# Patient Record
Sex: MALE | Race: WHITE | Employment: FULL TIME | URBAN - METROPOLITAN AREA
[De-identification: names, ages, dates, MRNs, and addresses within clinical notes are randomized per-mention and may not be internally consistent; named-entity substitution may affect disease eponyms.]

---

## 2017-01-06 NOTE — PERIOP NOTES
Dr. Rosa Marsh called to see if an H&P from 11/2016 is sufficient for surgery on 11/11/2017. Informed his that an H&P would have to be performed within 30 days of surgery. Dr. Rosa Marsh to fax over orders and H&P before patient's scheduled PAT appointment on 1/11/2016.   He is aware that our NP's do not do H&P's on Podiatry patients, per Patrizia.  DOS: 1/12/2016

## 2017-01-11 ENCOUNTER — ANESTHESIA EVENT (OUTPATIENT)
Dept: SURGERY | Age: 57
End: 2017-01-11
Payer: COMMERCIAL

## 2017-01-11 ENCOUNTER — HOSPITAL ENCOUNTER (OUTPATIENT)
Dept: PREADMISSION TESTING | Age: 57
Discharge: HOME | End: 2017-01-11
Payer: COMMERCIAL

## 2017-01-11 VITALS
TEMPERATURE: 97.8 F | SYSTOLIC BLOOD PRESSURE: 157 MMHG | HEART RATE: 79 BPM | DIASTOLIC BLOOD PRESSURE: 83 MMHG | WEIGHT: 161.6 LBS | RESPIRATION RATE: 17 BRPM | HEIGHT: 70 IN | OXYGEN SATURATION: 96 % | BODY MASS INDEX: 23.13 KG/M2

## 2017-01-11 LAB
ANION GAP BLD CALC-SCNC: 9 MMOL/L (ref 5–15)
APPEARANCE UR: CLEAR
APTT PPP: 29.6 SEC (ref 22.1–32.5)
ATRIAL RATE: 82 BPM
BACTERIA URNS QL MICRO: NEGATIVE /HPF
BASOPHILS # BLD AUTO: 0 K/UL (ref 0–0.1)
BASOPHILS # BLD: 1 % (ref 0–1)
BILIRUB UR QL: NEGATIVE
BUN SERPL-MCNC: 19 MG/DL (ref 6–20)
BUN/CREAT SERPL: 18 (ref 12–20)
CALCIUM SERPL-MCNC: 8.9 MG/DL (ref 8.5–10.1)
CALCULATED P AXIS, ECG09: 79 DEGREES
CALCULATED R AXIS, ECG10: 69 DEGREES
CALCULATED T AXIS, ECG11: 73 DEGREES
CHLORIDE SERPL-SCNC: 104 MMOL/L (ref 97–108)
CO2 SERPL-SCNC: 28 MMOL/L (ref 21–32)
COLOR UR: ABNORMAL
CREAT SERPL-MCNC: 1.03 MG/DL (ref 0.7–1.3)
DIAGNOSIS, 93000: NORMAL
EOSINOPHIL # BLD: 0.2 K/UL (ref 0–0.4)
EOSINOPHIL NFR BLD: 3 % (ref 0–7)
EPITH CASTS URNS QL MICRO: ABNORMAL /LPF
ERYTHROCYTE [DISTWIDTH] IN BLOOD BY AUTOMATED COUNT: 14.7 % (ref 11.5–14.5)
GLUCOSE SERPL-MCNC: 89 MG/DL (ref 65–100)
GLUCOSE UR STRIP.AUTO-MCNC: NEGATIVE MG/DL
HCT VFR BLD AUTO: 43.5 % (ref 36.6–50.3)
HGB BLD-MCNC: 13.5 G/DL (ref 12.1–17)
HGB UR QL STRIP: ABNORMAL
HYALINE CASTS URNS QL MICRO: ABNORMAL /LPF (ref 0–5)
INR PPP: 1 (ref 0.9–1.1)
KETONES UR QL STRIP.AUTO: NEGATIVE MG/DL
LEUKOCYTE ESTERASE UR QL STRIP.AUTO: NEGATIVE
LYMPHOCYTES # BLD AUTO: 16 % (ref 12–49)
LYMPHOCYTES # BLD: 1 K/UL (ref 0.8–3.5)
MCH RBC QN AUTO: 29 PG (ref 26–34)
MCHC RBC AUTO-ENTMCNC: 31 G/DL (ref 30–36.5)
MCV RBC AUTO: 93.3 FL (ref 80–99)
MONOCYTES # BLD: 0.6 K/UL (ref 0–1)
MONOCYTES NFR BLD AUTO: 10 % (ref 5–13)
NEUTS SEG # BLD: 4.3 K/UL (ref 1.8–8)
NEUTS SEG NFR BLD AUTO: 70 % (ref 32–75)
NITRITE UR QL STRIP.AUTO: NEGATIVE
P-R INTERVAL, ECG05: 178 MS
PH UR STRIP: 6.5 [PH] (ref 5–8)
PLATELET # BLD AUTO: 264 K/UL (ref 150–400)
POTASSIUM SERPL-SCNC: 4.3 MMOL/L (ref 3.5–5.1)
PROT UR STRIP-MCNC: NEGATIVE MG/DL
PROTHROMBIN TIME: 10 SEC (ref 9–11.1)
Q-T INTERVAL, ECG07: 388 MS
QRS DURATION, ECG06: 94 MS
QTC CALCULATION (BEZET), ECG08: 453 MS
RBC # BLD AUTO: 4.66 M/UL (ref 4.1–5.7)
RBC #/AREA URNS HPF: ABNORMAL /HPF (ref 0–5)
SODIUM SERPL-SCNC: 141 MMOL/L (ref 136–145)
SP GR UR REFRACTOMETRY: 1.02 (ref 1–1.03)
THERAPEUTIC RANGE,PTTT: NORMAL SECS (ref 58–77)
UA: UC IF INDICATED,UAUC: ABNORMAL
UROBILINOGEN UR QL STRIP.AUTO: 0.2 EU/DL (ref 0.2–1)
VENTRICULAR RATE, ECG03: 82 BPM
WBC # BLD AUTO: 6.1 K/UL (ref 4.1–11.1)
WBC URNS QL MICRO: ABNORMAL /HPF (ref 0–4)

## 2017-01-11 PROCEDURE — 80048 BASIC METABOLIC PNL TOTAL CA: CPT | Performed by: PODIATRIST

## 2017-01-11 PROCEDURE — 93005 ELECTROCARDIOGRAM TRACING: CPT

## 2017-01-11 PROCEDURE — 81001 URINALYSIS AUTO W/SCOPE: CPT | Performed by: PODIATRIST

## 2017-01-11 PROCEDURE — 85610 PROTHROMBIN TIME: CPT | Performed by: PODIATRIST

## 2017-01-11 PROCEDURE — 85730 THROMBOPLASTIN TIME PARTIAL: CPT | Performed by: PODIATRIST

## 2017-01-11 PROCEDURE — 85025 COMPLETE CBC W/AUTO DIFF WBC: CPT | Performed by: PODIATRIST

## 2017-01-11 PROCEDURE — 36415 COLL VENOUS BLD VENIPUNCTURE: CPT | Performed by: PODIATRIST

## 2017-01-11 RX ORDER — AZITHROMYCIN 500 MG/1
500 TABLET, FILM COATED ORAL
COMMUNITY
Start: 2017-01-09 | End: 2017-01-13

## 2017-01-11 NOTE — PERIOP NOTES
[facility]  PREOPERATIVE INSTRUCTIONS    Surgery Date:  Thursday, January 12, 2017. Surgery arrival time given by surgeon: NO   If no,[facility] staff will call you between 2 PM- 7PM the day before surgery with your arrival time. If your surgery is on a Monday, we will call you the preceding Friday. Please call [phone] after 8 PM if you did not receive your arrival time. Verification phone call on Wednesday, January 11, 2017. 1. Please [facility] at the designated [facility] [address] [facility]. Bring your insurance card, photo identification, and any copayment ( if applicable). 2. You must have a responsible adult to drive you home. You need to have a responsible adult to stay with you the first 24 hours after surgery if you are going home the same day of your surgery and you should not drive a car for 24 hours following your surgery. 3. Nothing to eat or drink after midnight the night before surgery. This includes no water, gum, mints, coffee, juice, etc.  Please note special instructions, if applicable, below for medications. 4. MEDICATIONS TO TAKE THE MORNING OF SURGERY WITH A SIP OF WATER: None. 5. No alcoholic beverages 24 hours before or after your surgery. 6. If you are being admitted to the hospital,please leave personal belongings/luggage in your car until you have an assigned hospital room number. 7. Stop Aspirin and/or any non-steroidal anti-inflammatory drugs (i.e. Ibuprofen, Naproxen, Advil, Aleve) as directed by your surgeon. You may take Tylenol. Stop herbal supplements 1 week prior to  surgery. 8. If you are currently taking Plavix, Coumadin,or any other blood-thinning/anticoagulant medication contact your surgeon for instructions. 9. Please wear comfortable clothes. Wear your glasses instead of contacts. We ask that all money, jewelry and valuables be left at home. Wear no make up, particularly mascara, the day of surgery.    10.  All body piercings, rings,and jewelry need to be removed and left at home. Please wear your hair loose or down. Please no pony-tails, buns, or any metal hair accessories. If you shower the morning of surgery, please do not apply any lotions, powders, or deodorants afterwards. Do not shave any body area within 24 hours of your surgery. 11. Please follow all instructions to avoid any potential surgical cancellation. 12.  Should your physical condition change, (i.e. fever, cold, flu, etc.) please notify your surgeon as soon as possible. 13. It is important to be on time. If a situation occurs where you may be delayed, please call:  [phone] / [phone] on the day of surgery. 14. The [facility] staff can be reached at [phone]. .  15. Special instructions: FRee  parking. Bring completed medication form on day of surgery . The patient was contacted  in person. He  verbalize  understanding of all instructions does not  need reinforcement.

## 2017-01-12 ENCOUNTER — ANESTHESIA (OUTPATIENT)
Dept: SURGERY | Age: 57
End: 2017-01-12
Payer: COMMERCIAL

## 2017-01-12 ENCOUNTER — APPOINTMENT (OUTPATIENT)
Dept: GENERAL RADIOLOGY | Age: 57
End: 2017-01-12
Attending: PODIATRIST
Payer: COMMERCIAL

## 2017-01-12 ENCOUNTER — HOSPITAL ENCOUNTER (OUTPATIENT)
Age: 57
Setting detail: OUTPATIENT SURGERY
Discharge: HOME | End: 2017-01-12
Attending: PODIATRIST | Admitting: PODIATRIST
Payer: COMMERCIAL

## 2017-01-12 VITALS
HEIGHT: 70 IN | SYSTOLIC BLOOD PRESSURE: 131 MMHG | BODY MASS INDEX: 23.13 KG/M2 | HEART RATE: 79 BPM | TEMPERATURE: 98 F | OXYGEN SATURATION: 96 % | DIASTOLIC BLOOD PRESSURE: 63 MMHG | RESPIRATION RATE: 24 BRPM | WEIGHT: 161.6 LBS

## 2017-01-12 PROCEDURE — 74011250636 HC RX REV CODE- 250/636: Performed by: PODIATRIST

## 2017-01-12 PROCEDURE — 76210000050 HC AMBSU PH II REC 0.5 TO 1 HR: Performed by: PODIATRIST

## 2017-01-12 PROCEDURE — 76000 FLUOROSCOPY <1 HR PHYS/QHP: CPT

## 2017-01-12 PROCEDURE — 77030018836 HC SOL IRR NACL ICUM -A: Performed by: PODIATRIST

## 2017-01-12 PROCEDURE — 77030020782 HC GWN BAIR PAWS FLX 3M -B

## 2017-01-12 PROCEDURE — 74011000250 HC RX REV CODE- 250

## 2017-01-12 PROCEDURE — 76030000000 HC AMB SURG OR TIME 0.5 TO 1: Performed by: PODIATRIST

## 2017-01-12 PROCEDURE — 77030031139 HC SUT VCRL2 J&J -A: Performed by: PODIATRIST

## 2017-01-12 PROCEDURE — 74011000250 HC RX REV CODE- 250: Performed by: PODIATRIST

## 2017-01-12 PROCEDURE — 77030013140 HC MSK NEB VYRM -A

## 2017-01-12 PROCEDURE — 77030020754 HC CUF TRNQT 2BLA STRY -B: Performed by: PODIATRIST

## 2017-01-12 PROCEDURE — 76060000061 HC AMB SURG ANES 0.5 TO 1 HR: Performed by: PODIATRIST

## 2017-01-12 PROCEDURE — 74011250636 HC RX REV CODE- 250/636: Performed by: ANESTHESIOLOGY

## 2017-01-12 PROCEDURE — 74011250636 HC RX REV CODE- 250/636

## 2017-01-12 PROCEDURE — 77030002916 HC SUT ETHLN J&J -A: Performed by: PODIATRIST

## 2017-01-12 PROCEDURE — 74011000250 HC RX REV CODE- 250: Performed by: ANESTHESIOLOGY

## 2017-01-12 PROCEDURE — 88300 SURGICAL PATH GROSS: CPT | Performed by: PODIATRIST

## 2017-01-12 RX ORDER — SODIUM CHLORIDE 0.9 % (FLUSH) 0.9 %
5-10 SYRINGE (ML) INJECTION AS NEEDED
Status: DISCONTINUED | OUTPATIENT
Start: 2017-01-12 | End: 2017-01-12 | Stop reason: HOSPADM

## 2017-01-12 RX ORDER — SODIUM CHLORIDE, SODIUM LACTATE, POTASSIUM CHLORIDE, CALCIUM CHLORIDE 600; 310; 30; 20 MG/100ML; MG/100ML; MG/100ML; MG/100ML
125 INJECTION, SOLUTION INTRAVENOUS CONTINUOUS
Status: DISCONTINUED | OUTPATIENT
Start: 2017-01-12 | End: 2017-01-12 | Stop reason: HOSPADM

## 2017-01-12 RX ORDER — LIDOCAINE HYDROCHLORIDE 10 MG/ML
0.1 INJECTION, SOLUTION EPIDURAL; INFILTRATION; INTRACAUDAL; PERINEURAL AS NEEDED
Status: DISCONTINUED | OUTPATIENT
Start: 2017-01-12 | End: 2017-01-12 | Stop reason: HOSPADM

## 2017-01-12 RX ORDER — HYDROMORPHONE HYDROCHLORIDE 1 MG/ML
.25-1 INJECTION, SOLUTION INTRAMUSCULAR; INTRAVENOUS; SUBCUTANEOUS
Status: DISCONTINUED | OUTPATIENT
Start: 2017-01-12 | End: 2017-01-12 | Stop reason: HOSPADM

## 2017-01-12 RX ORDER — PROPOFOL 10 MG/ML
INJECTION, EMULSION INTRAVENOUS AS NEEDED
Status: DISCONTINUED | OUTPATIENT
Start: 2017-01-12 | End: 2017-01-12 | Stop reason: HOSPADM

## 2017-01-12 RX ORDER — LIDOCAINE HYDROCHLORIDE 20 MG/ML
INJECTION, SOLUTION EPIDURAL; INFILTRATION; INTRACAUDAL; PERINEURAL AS NEEDED
Status: DISCONTINUED | OUTPATIENT
Start: 2017-01-12 | End: 2017-01-12 | Stop reason: HOSPADM

## 2017-01-12 RX ORDER — PROPOFOL 10 MG/ML
INJECTION, EMULSION INTRAVENOUS
Status: DISCONTINUED | OUTPATIENT
Start: 2017-01-12 | End: 2017-01-12 | Stop reason: HOSPADM

## 2017-01-12 RX ORDER — SODIUM CHLORIDE 0.9 % (FLUSH) 0.9 %
5-10 SYRINGE (ML) INJECTION EVERY 8 HOURS
Status: DISCONTINUED | OUTPATIENT
Start: 2017-01-12 | End: 2017-01-12 | Stop reason: HOSPADM

## 2017-01-12 RX ORDER — ONDANSETRON 2 MG/ML
4 INJECTION INTRAMUSCULAR; INTRAVENOUS AS NEEDED
Status: DISCONTINUED | OUTPATIENT
Start: 2017-01-12 | End: 2017-01-12 | Stop reason: HOSPADM

## 2017-01-12 RX ORDER — CEFAZOLIN SODIUM IN 0.9 % NACL 2 G/50 ML
2 INTRAVENOUS SOLUTION, PIGGYBACK (ML) INTRAVENOUS ONCE
Status: COMPLETED | OUTPATIENT
Start: 2017-01-12 | End: 2017-01-12

## 2017-01-12 RX ORDER — FENTANYL CITRATE 50 UG/ML
INJECTION, SOLUTION INTRAMUSCULAR; INTRAVENOUS AS NEEDED
Status: DISCONTINUED | OUTPATIENT
Start: 2017-01-12 | End: 2017-01-12 | Stop reason: HOSPADM

## 2017-01-12 RX ORDER — SODIUM CHLORIDE, SODIUM LACTATE, POTASSIUM CHLORIDE, CALCIUM CHLORIDE 600; 310; 30; 20 MG/100ML; MG/100ML; MG/100ML; MG/100ML
100 INJECTION, SOLUTION INTRAVENOUS CONTINUOUS
Status: DISCONTINUED | OUTPATIENT
Start: 2017-01-12 | End: 2017-01-12 | Stop reason: HOSPADM

## 2017-01-12 RX ORDER — ALBUTEROL SULFATE 0.83 MG/ML
2.5 SOLUTION RESPIRATORY (INHALATION)
Status: COMPLETED | OUTPATIENT
Start: 2017-01-12 | End: 2017-01-12

## 2017-01-12 RX ORDER — HYDROMORPHONE HYDROCHLORIDE 1 MG/ML
.5-1 INJECTION, SOLUTION INTRAMUSCULAR; INTRAVENOUS; SUBCUTANEOUS
Status: DISCONTINUED | OUTPATIENT
Start: 2017-01-12 | End: 2017-01-12 | Stop reason: HOSPADM

## 2017-01-12 RX ORDER — LIDOCAINE HYDROCHLORIDE 20 MG/ML
INJECTION, SOLUTION INFILTRATION; PERINEURAL AS NEEDED
Status: DISCONTINUED | OUTPATIENT
Start: 2017-01-12 | End: 2017-01-12 | Stop reason: HOSPADM

## 2017-01-12 RX ORDER — MIDAZOLAM HYDROCHLORIDE 1 MG/ML
INJECTION, SOLUTION INTRAMUSCULAR; INTRAVENOUS AS NEEDED
Status: DISCONTINUED | OUTPATIENT
Start: 2017-01-12 | End: 2017-01-12 | Stop reason: HOSPADM

## 2017-01-12 RX ORDER — BUPIVACAINE HYDROCHLORIDE 5 MG/ML
INJECTION, SOLUTION EPIDURAL; INTRACAUDAL AS NEEDED
Status: DISCONTINUED | OUTPATIENT
Start: 2017-01-12 | End: 2017-01-12 | Stop reason: HOSPADM

## 2017-01-12 RX ORDER — DIPHENHYDRAMINE HYDROCHLORIDE 50 MG/ML
12.5 INJECTION, SOLUTION INTRAMUSCULAR; INTRAVENOUS AS NEEDED
Status: DISCONTINUED | OUTPATIENT
Start: 2017-01-12 | End: 2017-01-12 | Stop reason: HOSPADM

## 2017-01-12 RX ADMIN — PROPOFOL 40 MG: 10 INJECTION, EMULSION INTRAVENOUS at 11:06

## 2017-01-12 RX ADMIN — LIDOCAINE HYDROCHLORIDE 60 MG: 20 INJECTION, SOLUTION INFILTRATION; PERINEURAL at 11:00

## 2017-01-12 RX ADMIN — CEFAZOLIN 2 G: 1 INJECTION, POWDER, FOR SOLUTION INTRAMUSCULAR; INTRAVENOUS; PARENTERAL at 11:00

## 2017-01-12 RX ADMIN — SODIUM CHLORIDE, POTASSIUM CHLORIDE, SODIUM LACTATE AND CALCIUM CHLORIDE: 600; 310; 30; 20 INJECTION, SOLUTION INTRAVENOUS at 10:53

## 2017-01-12 RX ADMIN — PROPOFOL 50 MG: 10 INJECTION, EMULSION INTRAVENOUS at 11:05

## 2017-01-12 RX ADMIN — FENTANYL CITRATE 25 MCG: 50 INJECTION, SOLUTION INTRAMUSCULAR; INTRAVENOUS at 11:10

## 2017-01-12 RX ADMIN — PROPOFOL 20 MG: 10 INJECTION, EMULSION INTRAVENOUS at 11:04

## 2017-01-12 RX ADMIN — PROPOFOL 50 MCG/KG/MIN: 10 INJECTION, EMULSION INTRAVENOUS at 11:01

## 2017-01-12 RX ADMIN — MIDAZOLAM HYDROCHLORIDE 2 MG: 1 INJECTION, SOLUTION INTRAMUSCULAR; INTRAVENOUS at 10:53

## 2017-01-12 RX ADMIN — ALBUTEROL SULFATE 2.5 MG: 2.5 SOLUTION RESPIRATORY (INHALATION) at 10:25

## 2017-01-12 RX ADMIN — PROPOFOL 80 MG: 10 INJECTION, EMULSION INTRAVENOUS at 11:00

## 2017-01-12 NOTE — ANESTHESIA POSTPROCEDURE EVALUATION
Post-Anesthesia Evaluation and Assessment    Patient: Kaye Pendleton MRN: [id number]  SSN: [id number]    YOB: 1960  Age: 60 y.o. Sex: male       Cardiovascular Function/Vital Signs  Visit Vitals    /63    Pulse 79    Temp 36.7 °C (98 °F)    Resp 24    Ht 5' 10\" (1.778 m)    Wt 73.3 kg (161 lb 9.6 oz)    SpO2 96%    BMI 23.19 kg/m2       Patient is status post MAC anesthesia for Procedure(s):  [address] [facility]. Nausea/Vomiting: None    Postoperative hydration reviewed and adequate. Pain:  Pain Scale 1: Numeric (0 - 10) (01/12/17 1132)  Pain Intensity 1: 0 (01/12/17 1132)   Managed    Neurological Status:   Neuro (WDL): Within Defined Limits (moving all 4 extremities with ease) (01/12/17 1132)   At baseline    Mental Status and Level of Consciousness: Arousable    Pulmonary Status:   O2 Device: Nasal cannula (room air) (01/12/17 1132)   Adequate oxygenation and airway patent    Complications related to anesthesia: None    Post-anesthesia assessment completed.  No concerns    Signed By: Clara Hansen MD     January 12, 2017

## 2017-01-12 NOTE — OP NOTES
Xander Nya[facility]   [address] [facility], [place], operated [facility], [address]   OP NOTE       Name:  Lynette Brennan   MR#:  [id number]   :  1960   Account #:  [de-identified]    Surgery Date:  2017   Date of Adm:  2017       PREOPERATIVE DIAGNOSIS: Painful internal fixation, right hallux. POSTOPERATIVE DIAGNOSIS: Painful internal fixation, right hallux. PROCEDURES PERFORMED: Removal internal fixation, right hallux. SURGEON: Inocencia Wilder DPM    ANESTHESIA: Local infiltrative block with intravenous sedation, local   anesthesia consisting of 10 mL of a 50/50 mixture of 2% lidocaine plain   and 0.5% Marcaine plain. TIME OPERATION BEGAN: 11:14 a.m. TIME OPERATION ENDED: 11:26 a.m. ESTIMATED BLOOD LOSS: Less than 5 mL. SPECIMENS REMOVED: One cannulated 4.0 x 42 mm Synthes   screw, right hallux. INDICATIONS: On clinical examination of one of his postop visits,   week 7, after his right foot surgery, the patient was complaining about   some pain distally in his right hallux. It was noticed that the edge of the   screw that was in the right hallux had backed out to the edge of the   skin and was noticeable. X-ray was taken, confirming this and   the patient was asked if he was, at any time, not wear his surgical shoe   for ambulation, and he said yes, for about a week prior to the visit he   had been ambulating in normal shoes and not his surgical shoe, as   was required in our plan. I indicated to the patient that he was acting   against medical advice and this could affect the outcome of the surgery   on that toe. DESCRIPTION OF PROCEDURE: The patient was brought to the   operating room and placed in the supine position. After being suitably   anesthetized, the patient's right lower extremity was prepped and   draped in the usual sterile manner.     REMOVAL OF INTERNAL FIXATION, RIGHT HALLUX: Attention was   directed to the distal tip of the right hallux where it  was noticed the   head of the screw was appearing through the skin and accessible. At   this point, a Synthes  screwdriver was used to back out the 4.0 x 42   mm cannulated screw. The screw came out in one piece and was   passed off the table for pathologic examination and confirmation. At   this point, the C-arm was used to scan the toe and it was noticed there   were no metal fragments left in the right hallux. The surgical area was   closed with 4-0 nylon suture in a simple interrupted manner. Next, the   wound was dressed with Betadine-soaked Adaptic, gauze sponges   and Thierry. The patient tolerated the surgical procedure and anesthesia well   without significant changes in cardiopulmonary status. The patient was   given postoperative care instructions prior to the procedure. It was   indicated that he would continue wearing his surgical shoe until   otherwise told not to. He had brought it with him to the hospital today. The patient was also given a prescription for Norco 7.5/325 mg 1 or 2   taken by mouth every 4-6 hours as needed for foot pain. The patient   was given an appointment for followup on Thursday 01/19/2017 in my   office at 10 a.m. At the time of discharge, the patient's vital signs stable   and there were no signs of active bleeding noted in this foot.         KAIT Welch / JOCELIN   D:  01/12/2017   14:51   T:  01/12/2017   15:56   Job #:  [id number]

## 2017-01-12 NOTE — DISCHARGE INSTRUCTIONS
Jason Miranda MEDICATION AND   SIDE EFFECT GUIDE    The Madhav Johnson MEDICATION AND SIDE EFFECT GUIDE was provided to the PATIENT AND CARE PROVIDER.   Information provided includes instruction about drug purpose and common side effects for the following medications:   ·   Jame Handler

## 2017-01-12 NOTE — ANESTHESIA PREPROCEDURE EVALUATION
Anesthetic History   No history of anesthetic complications            Review of Systems / Medical History  Patient summary reviewed    Pulmonary          Smoker         Neuro/Psych   Within defined limits           Cardiovascular    Hypertension              Exercise tolerance: >4 METS     GI/Hepatic/Renal  Within defined limits              Endo/Other  Within defined limits           Other Findings              Physical Exam    Airway  Mallampati: II  TM Distance: 4 - 6 cm  Neck ROM: normal range of motion   Mouth opening: Normal     Cardiovascular    Rhythm: regular  Rate: normal         Dental         Pulmonary                Comments: Min wheezing bilaterally - rx'ed with nebulized albuterol Abdominal         Other Findings            Anesthetic Plan    ASA: 2  Anesthesia type: MAC            Anesthetic plan and risks discussed with: Patient

## 2017-07-17 ENCOUNTER — APPOINTMENT (OUTPATIENT)
Dept: GENERAL RADIOLOGY | Age: 57
End: 2017-07-17
Attending: ORTHOPAEDIC SURGERY
Payer: COMMERCIAL

## 2017-07-17 ENCOUNTER — HOSPITAL ENCOUNTER (OUTPATIENT)
Age: 57
Setting detail: OUTPATIENT SURGERY
Discharge: HOME | End: 2017-07-17
Attending: ORTHOPAEDIC SURGERY | Admitting: ORTHOPAEDIC SURGERY
Payer: COMMERCIAL

## 2017-07-17 VITALS
TEMPERATURE: 99.2 F | DIASTOLIC BLOOD PRESSURE: 96 MMHG | WEIGHT: 163.14 LBS | SYSTOLIC BLOOD PRESSURE: 180 MMHG | RESPIRATION RATE: 16 BRPM | OXYGEN SATURATION: 100 % | BODY MASS INDEX: 23.36 KG/M2 | HEIGHT: 70 IN | HEART RATE: 96 BPM

## 2017-07-17 PROCEDURE — 74011636320 HC RX REV CODE- 636/320: Performed by: ORTHOPAEDIC SURGERY

## 2017-07-17 PROCEDURE — 77030003666 HC NDL SPINAL BD -A: Performed by: ORTHOPAEDIC SURGERY

## 2017-07-17 PROCEDURE — 76030000002 HC AMB SURG OR TIME FIRST 0.: Performed by: ORTHOPAEDIC SURGERY

## 2017-07-17 PROCEDURE — 76000 FLUOROSCOPY <1 HR PHYS/QHP: CPT

## 2017-07-17 PROCEDURE — 74011000250 HC RX REV CODE- 250: Performed by: ORTHOPAEDIC SURGERY

## 2017-07-17 PROCEDURE — 74011250636 HC RX REV CODE- 250/636: Performed by: ORTHOPAEDIC SURGERY

## 2017-07-17 PROCEDURE — 76210000046 HC AMBSU PH II REC FIRST 0.5 HR: Performed by: ORTHOPAEDIC SURGERY

## 2017-07-17 RX ORDER — LIDOCAINE HYDROCHLORIDE 10 MG/ML
INJECTION INFILTRATION; PERINEURAL AS NEEDED
Status: DISCONTINUED | OUTPATIENT
Start: 2017-07-17 | End: 2017-07-17 | Stop reason: HOSPADM

## 2017-07-17 RX ORDER — BUPIVACAINE HYDROCHLORIDE 5 MG/ML
INJECTION, SOLUTION EPIDURAL; INTRACAUDAL AS NEEDED
Status: DISCONTINUED | OUTPATIENT
Start: 2017-07-17 | End: 2017-07-17 | Stop reason: HOSPADM

## 2017-07-17 RX ORDER — TRIAMCINOLONE ACETONIDE 40 MG/ML
INJECTION, SUSPENSION INTRA-ARTICULAR; INTRAMUSCULAR AS NEEDED
Status: DISCONTINUED | OUTPATIENT
Start: 2017-07-17 | End: 2017-07-17 | Stop reason: HOSPADM

## 2017-07-17 RX ORDER — ACETAMINOPHEN AND CODEINE PHOSPHATE 300; 30 MG/1; MG/1
1 TABLET ORAL
COMMUNITY
End: 2022-07-29

## 2017-07-17 NOTE — IP AVS SNAPSHOT
[address] [facility] [address] 
 
 
 [address] [phone] Patient: Luca Sanchez MRN: [id number] OEX:3/47/1388 You are allergic to the following Allergen Reactions Adhesive Tape-Silicones Rash Please use cloth tape Recent Documentation Height Weight BMI Smoking Status 1.778 m 74 kg 23.41 kg/m2 Current Every Day Smoker Emergency Contacts Name Discharge Info Relation Home Work Mobile Elizabeth Schulte DISCHARGE CAREGIVER [3] Spouse [3] [phone] About your hospitalization You were admitted on:  July 17, 2017 You last received care in the:  [facility] PACU You were discharged on:  July 17, 2017 Unit phone number:  [phone] Why you were hospitalized Your primary diagnosis was:  Not on File Providers Seen During Your Hospitalizations Provider Role Specialty Primary office phone Lianne Cramer MD Attending Provider Orthopedic Surgery [phone] Your Primary Care Physician (PCP) Primary Care Physician Office Phone Office Fax Reginold Veronika [phone] Follow-up Information Follow up With Details Comments Contact Info Magen Li MD   [address] [facility] [address] [phone] Current Discharge Medication List  
  
CONTINUE these medications which have NOT CHANGED Dose & Instructions Dispensing Information Comments Morning Noon Evening Bedtime  
 albuterol 90 mcg/actuation inhaler Commonly known as:  PROVENTIL HFA, VENTOLIN HFA, PROAIR HFA Your last dose was: Your next dose is:    
   
   
 Dose:  2 Puff Take 2 Puffs by inhalation every six (6) hours as needed for Wheezing. Refills:  0  
     
   
   
   
  
 amLODIPine 10 mg tablet Commonly known as:  Lito Gleason Your last dose was:     
   
Your next dose is:    
   
   
 Dose:  10 mg  
 Take 10 mg by mouth daily (with dinner). Takes at 1600 daily. Refills:  0  
     
   
   
   
  
 TYLENOL-CODEINE #3 300-30 mg per tablet Generic drug:  acetaminophen-codeine Your last dose was: Your next dose is:    
   
   
 Dose:  1 Tab Take 1 Tab by mouth every four (4) hours as needed for Pain. Refills:  0 Discharge Instructions GENERAL DISCHARGE INSTRUCTIONS Patient: Berhane Veliz MRN: [id number]  SSN: [id number] Please take the time to review the following instructions before you leave the hospital and use them as guidelines during your recovery from surgery. If you have any questions you may contact my office at [phone]. SPECIAL INSTRUCTIONS :  
1. *** 
2. *** Wound Care/ Dressing Changes: SOFT DRESSING : For soft dressings you may change your dressings as needed two days after surgery. It isnt necessary to apply antibiotic ointment to your incisions. If you have steri-strips over your incision they will start to peel off in 7-10 days as you get them wet. They dont need to be removed before that. When they begin to peel off, you may remove them. Sutures or staples are removed at 12-14 days from surgery during your follow-up with Dr Zane Regalado. Showering/ Bathing: SOFT DRESSINGS ONLY : If your incision is dry with drainage you may shower 2 days after your surgery. Your dressing may be removed for showering. You may get your incisions wet in the shower. Do not vigorously scrub your incisions. Apply a clean, dry dressing after you have dried your incisions. Do not take a bath or get into a swimming pool or [facility] until you follow up with Dr. Zane Regalado. Do not soak your incision under water. If there is continued drainage or you are concerned contact Dr Gladys Simmons office prior to showering. Ice and Elevation:  
 
Ice may be applied to the incision and the skin should be protected by a layer of clothing or padding. Both upper and lower extremity surgeries benefit from elevation. For foot and ankle surgery elevation with the patient flat on the back and two to three pillows elevating the extremity may be necessary. Diet: 
You may advance to your regular diet as tolerated. Increase your clear liquid intake for the next 2-3 days. Medication: 
 
 
1. You will be given prescriptions for pain medication when you are discharged from the hospital. The side effects of these medications can be substantial and the narcotic medications are not mandatory. You may substitute these medications with Tylenol and Alleve or Motrin. 2.  Please use the medications as prescribed. Pain medications may cause constipation- Colace or Milk of Magnesia may be used as needed. Other possible side effects of pain medication are dizziness, headache, nausea, vomiting, and urinary retention. Discontinue the pain medication if you develop itching, rash, shortness of breath, or difficulties swallowing. If these symptoms become severe or are not relieved by discontinuing the medication, you should seek immediate medical attention. 3. Refills of pain medication are authorized during office hours only (8 AM- 5 PM  Monday thru Friday). Many of these medication will require you or a family member to pick-up a physical prescription at the office. 4. Medications other than antiinflammatories will not be called into the pharmacy after business hours. 5. Do not take Tylenol/Acetaminophen in addition to your pain medication as most pain medications already contain this ingredient. Do not exceed 4000mg of Tylenol/Acetaminophen per day. 6. You may resume the medication(s) you were taking prior to your surgery. Narcotics may change the effects of some antidepressant medication(s).   If you have any questions about possible interactions between your regular medications and the pain medication, you should ask the pharmacist or contact the prescribing physician. 7. You will be discharged with prescriptions for additional pain medications (Tramadol or Toradol) and a medication for nausea and vomiting (Phenergan). You only need to fill these prescriptions if the primary pain medication is not working or you experiencing post-op nausea. 8. If you have constipation which is not improved by oral stool softeners then a Ducolax suppository should be purchased over the counter. Follow up appointment: 
 
Please follow up at your scheduled appointment 2 weeks from your surgery. If you do not already have an appointment please call our office at [phone] for your follow up appointment. Physical / Occupational Therapy: 
 
Physical Therapy following surgery will be arranged either prior to your discharge or at your follow-up appointment. Therapy may not be necessary for all surgeries. Please contact our office if you have questions. Returning to work: 
 
Normally we will keep you out of work until you return for your first follow up appointment from surgery. If you feel comfortable returning to work sooner, please call our office. You can discuss with Dr. Geovanny Barbosa if additional time off  is needed at your first follow up appointment after surgery. Important Signs and Symptoms: 
 
If any of the following signs or symptoms occur, you should contact Dr. Kevin Flores office. Please be advised if a problem arises which you feel requires immediate medical attention or you are unable to contact Dr. Kevin Flores office you should seek immediate medical attention at the ER or other health care facility you have access to. 
 
1. A sudden increase in swelling and/or redness or warmth at the area your surgery was performed which isnt relieved by rest, ice, and elevation.  
2. Oral temperature greater than 101 degrees for 12 hours or more which isnt relieved by an increase in fluid intake and taking 2 Tylenol every 4-6 hours. 3. Excessive drainage from your incisions, or drainage which hasnt stopped by 72 hours after your surgery. 4. Fever, chills, shortness of breath, chest pain, nausea, vomiting or other signs and symptoms which are of concern to you. Discharge Orders None Introducing \[facility] [place] [facility]\"" & HEALTH SERVICES! Jennifer Hernandez introduces [employer] patient portal. Now you can access parts of your medical record, email your doctor's office, and request medication refills online. 1. In your internet browser, go to [email or web address] 2. Click on the First Time User? Click Here link in the Sign In box. You will see the New Member Sign Up page. 3. Enter your [employer] Access Code exactly as it appears below. You will not need to use this code after youve completed the sign-up process. If you do not sign up before the expiration date, you must request a new code. · [employer] Access Code: [email or web address] Expires: 10/12/2017  5:22 PM 
 
4. Enter the last four digits of your Social Security Number (xxxx) and Date of Birth (mm/dd/yyyy) as indicated and click Submit. You will be taken to the next sign-up page. 5. Create a [employer] ID. This will be your [employer] login ID and cannot be changed, so think of one that is secure and easy to remember. 6. Create a [employer] password. You can change your password at any time. 7. Enter your Password Reset Question and Answer. This can be used at a later time if you forget your password. 8. Enter your e-mail address. You will receive e-mail notification when new information is available in [address] 9. Click Sign Up. You can now view and download portions of your medical record. 10. Click the Download Summary menu link to download a portable copy of your medical information.  
 
If you have questions, please visit the Frequently Asked Questions section of the [employer]. Remember, MyChart is NOT to be used for urgent needs. For medical emergencies, dial 911. Now available from your iPhone and Android! General Information Please provide this summary of care documentation to your next provider. Patient Signature:  ____________________________________________________________ Date:  ____________________________________________________________  
  
Sissy Lapine Provider Signature:  ____________________________________________________________ Date:  ____________________________________________________________

## 2017-07-17 NOTE — OP NOTES
OPERATIVE REPORT    Admit Date: 7/17/2017  Admit Diagnosis: LEFT OA    Date of Procedure: 7/17/2017   Preoperative Diagnosis: LEFT OA  Postoperative Diagnosis: LEFT OA    Procedure: Procedure(s) with comments:  LEFT HIP INJECTION - LEFT HIP INJECTION  Surgeon: Sarah Beth Hodges MD  Assistant(s): None  Anesthesia: None   Estimated Blood Loss: 20cc  Specimens: * No specimens in log *   Complications: None      INDICATIONS:  Erica Alcocer is a patient with possible hip arthritis and was indicated for an injection. We discussed risks, alternatives and expectations prior to surgery. PROCEDURE PERFORMED :   The patient was seen in the pre-operative holding area and the proper limb initialized. Questions were answered and the patient was taken to the operating room for anesthesia. They were positioned on the table and the operative extremity was prepped and draped in a sterile fashion. The appropriate time-out was performed prior to the start. Utilizing flouro the hip was identified on AP radiographs. A spinal needle was used and localized to the intra-articular joint capsule. The hip was first injected with 1cc of Radio-opaque dye to ensure intra-articular placement. The hip was then injected with a combination of 2cc of Kenalog 40mg / cc, 2cc of 1% Lidocaine and 2cc of 0.5% Marcaine under sterile conditions. I performed the injection. Hip Arthritis Tonnis Grade : 2 (retained emil pins)    A sterile dressing was applied and the patient then recovered from anesthesia and was taken to the post-operative holding area in a stable condition.      IMPLANTS :   * No implants in log *      Sarah Beth Hodges MD  Pager [phone]

## 2017-07-17 NOTE — DISCHARGE INSTRUCTIONS
GENERAL DISCHARGE INSTRUCTIONS    Patient: Wallace Madden MRN: [id number]  SSN: [id number]              Please take the time to review the following instructions before you leave the hospital and use them as guidelines during your recovery from surgery. If you have any questions you may contact my office at [phone]. SPECIAL INSTRUCTIONS :   1.   2.       Wound Care/ Dressing Changes:     SOFT DRESSING : For soft dressings you may change your dressings as needed two days after surgery. It isnt necessary to apply antibiotic ointment to your incisions. If you have steri-strips over your incision they will start to peel off in 7-10 days as you get them wet. They dont need to be removed before that. When they begin to peel off, you may remove them. Sutures or staples are removed at 12-14 days from surgery during your follow-up with Dr Karime Fernandez. Showering/ Bathing:    SOFT DRESSINGS ONLY : If your incision is dry with drainage you may shower 2 days after your surgery. Your dressing may be removed for showering. You may get your incisions wet in the shower. Do not vigorously scrub your incisions. Apply a clean, dry dressing after you have dried your incisions. Do not take a bath or get into a swimming pool or [facility] until you follow up with Dr. Karime Fernandez. Do not soak your incision under water. If there is continued drainage or you are concerned contact Dr Mina Moore office prior to showering. Ice and Elevation: Ice may be applied to the incision and the skin should be protected by a layer of clothing or padding. Both upper and lower extremity surgeries benefit from elevation. For foot and ankle surgery elevation with the patient flat on the back and two to three pillows elevating the extremity may be necessary. Diet:  You may advance to your regular diet as tolerated. Increase your clear liquid intake for the next 2-3 days. Medication:      1.  You will be given prescriptions for pain medication when you are discharged from the hospital. The side effects of these medications can be substantial and the narcotic medications are not mandatory. You may substitute these medications with Tylenol and Alleve or Motrin. 2.  Please use the medications as prescribed. Pain medications may cause constipation- Colace or Milk of Magnesia may be used as needed. Other possible side effects of pain medication are dizziness, headache, nausea, vomiting, and urinary retention. Discontinue the pain medication if you develop itching, rash, shortness of breath, or difficulties swallowing. If these symptoms become severe or are not relieved by discontinuing the medication, you should seek immediate medical attention. 3. Refills of pain medication are authorized during office hours only (8 AM- 5 PM  Monday thru Friday). Many of these medication will require you or a family member to pick-up a physical prescription at the office. 4. Medications other than antiinflammatories will not be called into the pharmacy after business hours. 5. Do not take Tylenol/Acetaminophen in addition to your pain medication as most pain medications already contain this ingredient. Do not exceed 4000mg of Tylenol/Acetaminophen per day. 6. You may resume the medication(s) you were taking prior to your surgery. Narcotics may change the effects of some antidepressant medication(s). If you have any questions about possible interactions between your regular medications and the pain medication, you should ask the pharmacist or contact the prescribing physician. 7. You will be discharged with prescriptions for additional pain medications (Tramadol or Toradol) and a medication for nausea and vomiting (Phenergan). You only need to fill these prescriptions if the primary pain medication is not working or you experiencing post-op nausea.    8. If you have constipation which is not improved by oral stool softeners then a Ducolax suppository should be purchased over the counter. Follow up appointment:    Please follow up at your scheduled appointment 2 weeks from your surgery. If you do not already have an appointment please call our office at [phone] for your follow up appointment. Physical / Occupational Therapy:    Physical Therapy following surgery will be arranged either prior to your discharge or at your follow-up appointment. Therapy may not be necessary for all surgeries. Please contact our office if you have questions. Returning to work:    Normally we will keep you out of work until you return for your first follow up appointment from surgery. If you feel comfortable returning to work sooner, please call our office. You can discuss with Dr. Merari Stokes if additional time off  is needed at your first follow up appointment after surgery. Important Signs and Symptoms:    If any of the following signs or symptoms occur, you should contact Dr. Sandra Koch office. Please be advised if a problem arises which you feel requires immediate medical attention or you are unable to contact Dr. Sandra Koch office you should seek immediate medical attention at the ER or other health care facility you have access to.    1. A sudden increase in swelling and/or redness or warmth at the area your surgery was performed which isnt relieved by rest, ice, and elevation. 2. Oral temperature greater than 101 degrees for 12 hours or more which isnt relieved by an increase in fluid intake and taking 2 Tylenol every 4-6 hours. 3. Excessive drainage from your incisions, or drainage which hasnt stopped by 72 hours after your surgery. 4. Fever, chills, shortness of breath, chest pain, nausea, vomiting or other signs and symptoms which are of concern to you.

## 2017-07-17 NOTE — H&P
Orthopaedic PRE-OP Admission History and Physical    Past Medical History:   Diagnosis Date    Chronic obstructive pulmonary disease ([address]     per xray report 3/24/16. PCP Dr. Kelin Gant Chronic pain     tamera feet, tamera knees    Hypertension       Past Surgical History:   Procedure Laterality Date    HX ORTHOPAEDIC      pins in left hip due to growth delay at age 11   [facility]      HX TONSILLECTOMY      HX UROLOGICAL      testicular surgery to \"clean a gland out\", 30 years ago    WRIST ARTHROSCOP,CLEAN/DRAIN      traumatic wrist injury, tendons were repaired      Prior to Admission medications    Medication Sig Start Date End Date Taking? Authorizing Provider   amLODIPine (NORVASC) 10 mg tablet Take 10 mg by mouth daily (with dinner). Takes at 1600 daily. Historical Provider   albuterol (PROVENTIL HFA, VENTOLIN HFA, PROAIR HFA) 90 mcg/actuation inhaler Take 2 Puffs by inhalation every six (6) hours as needed for Wheezing. Historical Provider     No current facility-administered medications for this encounter. Current Outpatient Prescriptions   Medication Sig    amLODIPine (NORVASC) 10 mg tablet Take 10 mg by mouth daily (with dinner). Takes at 1600 daily.  albuterol (PROVENTIL HFA, VENTOLIN HFA, PROAIR HFA) 90 mcg/actuation inhaler Take 2 Puffs by inhalation every six (6) hours as needed for Wheezing. Allergies   Allergen Reactions    Adhesive Tape-Silicones Rash     Please use cloth tape          Review of Systems  Review of systems was documented in PAT and also in the HPI. Physical Exam  Gen: No acute distress   Resp: No accessory muscle use, no acute distress, conversant without gasping, clear lung fields. Card: No abnormalities detected, RRR- See PAT exam if available. Abd: Soft, non-tender, non-distended  Lymph: No palpable lymph nodes of the affected extremity  Skin: No skin breakdown noted.      Labs: No results for input(s): WBC, HGB, HCT, K, CREA, GLU, CRP, HGBEXT, HCTEXT in the last 72 hours. No lab exists for component: ESR    [facility] Note - Subjective / Exam / Rachell Dorman / Plan       post op hip inj   SUBJECTIVE :   The patient returns in follow-up today for postop hip injection evaluation. Since the previous visit the patient notes marked improvement following the hip injection. The patient overall has been doing well, minimal symptoms on the left.      PREVIOUS TREATMENT HISTORY :   Activity modification, physician directed activities, anti-inflammatories      OBJECTIVE :  Left hip evaluation demonstrates normal rotation, negative seated Stinchfield, good overall motion. The patient is walking normally, no limp.      RADIOGRAPHIC EVALUATION :   None      ASSESSMENT :  Left hip very mild arthritis      PLAN:   DISCUSSION : I have recommended continued close observation, the patient may call back at any point to reschedule an injection. MEDICAL CONSIDERATIONS : None  PHYSICAL THERAPY : At home exercises  MEDICATIONS : Anti-inflammatories as needed   FOLLOW-UP : The patient will call back as needed, repeat hip injection if symptoms return. Surgical Counseling   After a thorough discussion we will proceed with surgical intervention without contraindications. I discussed surgical indications and alternatives with the patient. Risks including infection, bleeding, damage to other structures, need for further surgery and the risks of anesthesia (DVT, PE, Stroke, Heart Attack and Death) have been discussed with the patient. Verbal and written consent were obtained.          Scott Ferguson MD  Cell [phone]  Nurse [address] [place] [address] [phone]  Medical Staff : Santino Shah / Tori Hernandez  Office : [phone] ext  [phone]

## 2017-07-30 ENCOUNTER — HOSPITAL ENCOUNTER (EMERGENCY)
Age: 57
Discharge: HOME | End: 2017-07-30
Attending: STUDENT IN AN ORGANIZED HEALTH CARE EDUCATION/TRAINING PROGRAM
Payer: COMMERCIAL

## 2017-07-30 VITALS
RESPIRATION RATE: 16 BRPM | OXYGEN SATURATION: 100 % | DIASTOLIC BLOOD PRESSURE: 97 MMHG | WEIGHT: 170 LBS | TEMPERATURE: 97.9 F | HEART RATE: 90 BPM | SYSTOLIC BLOOD PRESSURE: 163 MMHG | HEIGHT: 70 IN | BODY MASS INDEX: 24.34 KG/M2

## 2017-07-30 DIAGNOSIS — F10.10 ALCOHOL ABUSE, DAILY USE: ICD-10-CM

## 2017-07-30 DIAGNOSIS — I10 ESSENTIAL HYPERTENSION: Primary | ICD-10-CM

## 2017-07-30 DIAGNOSIS — Z91.14 NON COMPLIANCE W MEDICATION REGIMEN: ICD-10-CM

## 2017-07-30 DIAGNOSIS — F17.200 NICOTINE DEPENDENCE, UNCOMPLICATED, UNSPECIFIED NICOTINE PRODUCT TYPE: ICD-10-CM

## 2017-07-30 LAB
ALBUMIN SERPL BCP-MCNC: 3.6 G/DL (ref 3.5–5)
ALBUMIN/GLOB SERPL: 1.1 {RATIO} (ref 1.1–2.2)
ALP SERPL-CCNC: 41 U/L (ref 45–117)
ALT SERPL-CCNC: 23 U/L (ref 12–78)
ANION GAP BLD CALC-SCNC: 9 MMOL/L (ref 5–15)
AST SERPL W P-5'-P-CCNC: 25 U/L (ref 15–37)
BASOPHILS # BLD AUTO: 0.1 K/UL (ref 0–0.1)
BASOPHILS # BLD: 1 % (ref 0–1)
BILIRUB SERPL-MCNC: 0.6 MG/DL (ref 0.2–1)
BUN SERPL-MCNC: 13 MG/DL (ref 6–20)
BUN/CREAT SERPL: 12 (ref 12–20)
CALCIUM SERPL-MCNC: 8.5 MG/DL (ref 8.5–10.1)
CHLORIDE SERPL-SCNC: 100 MMOL/L (ref 97–108)
CO2 SERPL-SCNC: 26 MMOL/L (ref 21–32)
CREAT SERPL-MCNC: 1.1 MG/DL (ref 0.7–1.3)
EOSINOPHIL # BLD: 0.2 K/UL (ref 0–0.4)
EOSINOPHIL NFR BLD: 2 % (ref 0–7)
ERYTHROCYTE [DISTWIDTH] IN BLOOD BY AUTOMATED COUNT: 14.1 % (ref 11.5–14.5)
GLOBULIN SER CALC-MCNC: 3.4 G/DL (ref 2–4)
GLUCOSE SERPL-MCNC: 106 MG/DL (ref 65–100)
HCT VFR BLD AUTO: 44.6 % (ref 36.6–50.3)
HGB BLD-MCNC: 14.9 G/DL (ref 12.1–17)
LYMPHOCYTES # BLD AUTO: 16 % (ref 12–49)
LYMPHOCYTES # BLD: 1.1 K/UL (ref 0.8–3.5)
MAGNESIUM SERPL-MCNC: 1.8 MG/DL (ref 1.6–2.4)
MCH RBC QN AUTO: 30.1 PG (ref 26–34)
MCHC RBC AUTO-ENTMCNC: 33.4 G/DL (ref 30–36.5)
MCV RBC AUTO: 90.1 FL (ref 80–99)
MONOCYTES # BLD: 0.6 K/UL (ref 0–1)
MONOCYTES NFR BLD AUTO: 9 % (ref 5–13)
NEUTS SEG # BLD: 4.9 K/UL (ref 1.8–8)
NEUTS SEG NFR BLD AUTO: 72 % (ref 32–75)
PLATELET # BLD AUTO: 198 K/UL (ref 150–400)
POTASSIUM SERPL-SCNC: 4.1 MMOL/L (ref 3.5–5.1)
PROT SERPL-MCNC: 7 G/DL (ref 6.4–8.2)
RBC # BLD AUTO: 4.95 M/UL (ref 4.1–5.7)
SODIUM SERPL-SCNC: 135 MMOL/L (ref 136–145)
WBC # BLD AUTO: 6.8 K/UL (ref 4.1–11.1)

## 2017-07-30 PROCEDURE — 83735 ASSAY OF MAGNESIUM: CPT | Performed by: PHYSICIAN ASSISTANT

## 2017-07-30 PROCEDURE — 85025 COMPLETE CBC W/AUTO DIFF WBC: CPT | Performed by: PHYSICIAN ASSISTANT

## 2017-07-30 PROCEDURE — 80053 COMPREHEN METABOLIC PANEL: CPT | Performed by: PHYSICIAN ASSISTANT

## 2017-07-30 PROCEDURE — 99284 EMERGENCY DEPT VISIT MOD MDM: CPT

## 2017-07-30 PROCEDURE — 36415 COLL VENOUS BLD VENIPUNCTURE: CPT | Performed by: PHYSICIAN ASSISTANT

## 2017-07-30 PROCEDURE — 74011250637 HC RX REV CODE- 250/637: Performed by: PHYSICIAN ASSISTANT

## 2017-07-30 RX ORDER — AMLODIPINE BESYLATE 5 MG/1
10 TABLET ORAL
Status: COMPLETED | OUTPATIENT
Start: 2017-07-30 | End: 2017-07-30

## 2017-07-30 RX ORDER — AMLODIPINE BESYLATE 10 MG/1
10 TABLET ORAL
Qty: 30 TAB | Refills: 0 | Status: ON HOLD | OUTPATIENT
Start: 2017-07-30 | End: 2022-07-29 | Stop reason: SDUPTHER

## 2017-07-30 RX ADMIN — AMLODIPINE BESYLATE 10 MG: 5 TABLET ORAL at 13:39

## 2017-07-30 NOTE — ED PROVIDER NOTES
HPI Comments: 59 y.o. male with past medical history significant for HTN who presents from home via private vehicle with chief complaint of elevated BP. Pt reports that for the past month he has not been taking his HTN medication because he did not think that he needed to take it any longer. However during that month he has had gradually increasing BP and intermittent HA, nausea, and muscle cramps. He is supposed to be taking Amlodipine 10mg daily. 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 However since then his prssure ha been more elevated with systolic pressures in the 200's. He contacted his PCP 2 days ago who advised him to go to the ED for BP control. Currently in the ED Pt denies CP, SOB, palpitations, diaphoresis, dizziness, HA, numbness or weakness, fever, chills, nausea, vomiting, diarrhea, abdominal pain. There are no other acute medical concerns at this time. Social hx: Pt admits to smoking about 1 pack of cigarettes a day and drinking 6-8 beers daily. Pt denies using any illicit substances. PCP: Brayan Mendoza MD  Note written by eleazar Bolivar, as dictated by Moiz Clarke PA-C 1:25 PM        The history is provided by the patient. Past Medical History:   Diagnosis Date    Chronic obstructive pulmonary disease ([address]     per xray report 3/24/16. PCP Dr. Ayanna David Chronic pain     tamera feet, tamera knees    Hypertension        Past Surgical History:   Procedure Laterality Date    HX ORTHOPAEDIC      pins in left hip due to growth delay at age 11   [facility]THOP[facility]EDIC      HX TONSILLECTOMY      HX UROLOGICAL      testicular surgery to \"clean a gland out\", 30 years ago    WRIST ARTHROSCOP,CLEAN/DRAIN      traumatic wrist injury, tendons were repaired         History reviewed. No pertinent family history.     Social History     Social History  Marital status:      Spouse name: N/A    Number of children: N/A    Years of education: N/A     Occupational History    Not on file. Social History Main Topics    Smoking status: Current Every Day Smoker     Packs/day: 0.50    Smokeless tobacco: Never Used    Alcohol use 3.6 - 4.2 oz/week     6 - 7 Cans of beer per week      Comment: 42-49 / week     Drug use: No      Comment: Past HX:  cocaine    Sexual activity: Yes     Partners: Female     Other Topics Concern    Not on file     Social History Narrative         ALLERGIES: Adhesive tape-silicones    Review of Systems   Constitutional: Negative for appetite change, chills, fatigue and fever. HENT: Negative for congestion, ear pain, postnasal drip, rhinorrhea, sneezing and sore throat. Eyes: Negative for redness and visual disturbance. Respiratory: Negative for cough, shortness of breath and wheezing. Cardiovascular: Negative for chest pain, palpitations and leg swelling. Gastrointestinal: Positive for nausea. Negative for abdominal pain, anal bleeding, constipation, diarrhea and vomiting. Genitourinary: Negative for difficulty urinating, dysuria, frequency and hematuria. Musculoskeletal: Positive for myalgias. Negative for arthralgias, back pain and neck stiffness. Skin: Negative for rash. Allergic/Immunologic: Negative for immunocompromised state. Neurological: Positive for headaches. Negative for dizziness, syncope, weakness and light-headedness. Hematological: Negative for adenopathy. Patient Vitals for the past 12 hrs:   Temp Pulse Resp BP SpO2   07/30/17 1510 - 90 16 (!) 163/97 100 %   07/30/17 1430 - - - 182/89 100 %   07/30/17 1415 - - - (!) 167/92 99 %   07/30/17 1405 - - - - 97 %   07/30/17 1404 - - - (!) 188/119 -   07/30/17 1306 97.9 °F (36.6 °C) 92 16 (!) 209/100 99 %              Physical Exam   Constitutional: He is oriented to person, place, and time. He appears well-developed and well-nourished. No distress. HENT:   Head: Normocephalic and atraumatic. Right Ear: External ear normal.   Left Ear: External ear normal.   Nose: Nose normal.   Mouth/Throat: Oropharynx is clear and moist.   Eyes: EOM are normal. Pupils are equal, round, and reactive to light. Neck: Neck supple. No JVD present. No tracheal deviation present. Cardiovascular: Normal rate, regular rhythm, normal heart sounds and intact distal pulses. Exam reveals no gallop and no friction rub. No murmur heard. Pulmonary/Chest: Effort normal and breath sounds normal. No stridor. No respiratory distress. He has no wheezes. He has no rales. He exhibits no tenderness. Abdominal: Soft. Bowel sounds are normal. He exhibits no distension and no mass. There is no tenderness. There is no rebound and no guarding. Musculoskeletal: Normal range of motion. He exhibits no edema, tenderness or deformity. Lymphadenopathy:     He has no cervical adenopathy. Neurological: He is alert and oriented to person, place, and time. No cranial nerve deficit. Coordination normal.   Skin: No rash noted. No erythema. No pallor. Psychiatric: He has a normal mood and affect. His behavior is normal.   Nursing note and vitals reviewed.        MDM  Number of Diagnoses or Management Options  Alcohol abuse, daily use:   Essential hypertension:   Nicotine dependence, uncomplicated, unspecified nicotine product type:   Non compliance w medication regimen:      Amount and/or Complexity of Data Reviewed  Clinical lab tests: ordered and reviewed  Tests in the medicine section of CPT®: reviewed and ordered  Obtain history from someone other than the patient: yes (wife)  Review and summarize past medical records: yes  Independent visualization of images, tracings, or specimens: yes    Patient Progress  Patient progress: stable    ED Course       Procedures  1:30 PM  Discussed with the patient the medical risks of prolonged smoking habits and advised the patient of the benefits of the cessation of smoking. TC Mcgovern     1:32 PM  Discussed with the patient the medical risks of excessive daily alcohol intake habits and advised the patient of the benefits of the cessation daily etoh intake  TC Mcgovern    1:42 PM   Discussed pt, sx, hx and current findings with Dr Sebastien Zapien. He is in agreement with crystal Herrera. SUNDEEP Talley        3:10 PM  bp improved. No sx. Will discharge home with meds. Phoebe Herrera. SUNDEEP Talley    LABORATORY TESTS:  Recent Results (from the past 12 hour(s))   CBC WITH AUTOMATED DIFF    Collection Time: 07/30/17  1:36 PM   Result Value Ref Range    WBC 6.8 4.1 - 11.1 K/uL    RBC 4.95 4.10 - 5.70 M/uL    HGB 14.9 12.1 - 17.0 g/dL    HCT 44.6 36.6 - 50.3 %    MCV 90.1 80.0 - 99.0 FL    MCH 30.1 26.0 - 34.0 PG    MCHC 33.4 30.0 - 36.5 g/dL    RDW 14.1 11.5 - 14.5 %    PLATELET 422 937 - 331 K/uL    NEUTROPHILS 72 32 - 75 %    LYMPHOCYTES 16 12 - 49 %    MONOCYTES 9 5 - 13 %    EOSINOPHILS 2 0 - 7 %    BASOPHILS 1 0 - 1 %    ABS. NEUTROPHILS 4.9 1.8 - 8.0 K/UL    ABS. LYMPHOCYTES 1.1 0.8 - 3.5 K/UL    ABS. MONOCYTES 0.6 0.0 - 1.0 K/UL    ABS. EOSINOPHILS 0.2 0.0 - 0.4 K/UL    ABS. BASOPHILS 0.1 0.0 - 0.1 K/UL   METABOLIC PANEL, COMPREHENSIVE    Collection Time: 07/30/17  1:36 PM   Result Value Ref Range    Sodium 135 (L) 136 - 145 mmol/L    Potassium 4.1 3.5 - 5.1 mmol/L    Chloride 100 97 - 108 mmol/L    CO2 26 21 - 32 mmol/L    Anion gap 9 5 - 15 mmol/L    Glucose 106 (H) 65 - 100 mg/dL    BUN 13 6 - 20 MG/DL    Creatinine 1.10 0.70 - 1.30 MG/DL    BUN/Creatinine ratio 12 12 - 20      GFR est AA >60 >60 ml/min/1.73m2    GFR est non-AA >60 >60 ml/min/1.73m2    Calcium 8.5 8.5 - 10.1 MG/DL    Bilirubin, total 0.6 0.2 - 1.0 MG/DL    ALT (SGPT) 23 12 - 78 U/L    AST (SGOT) 25 15 - 37 U/L    Alk.  phosphatase 41 (L) 45 - 117 U/L    Protein, total 7.0 6.4 - 8.2 g/dL    Albumin 3.6 3.5 - 5.0 g/dL    Globulin 3.4 2.0 - 4.0 g/dL    A-G Ratio 1.1 1.1 - 2.2 MAGNESIUM    Collection Time: 07/30/17  1:36 PM   Result Value Ref Range    Magnesium 1.8 1.6 - 2.4 mg/dL       IMAGING RESULTS:  No orders to display       MEDICATIONS GIVEN:  Medications   amLODIPine (NORVASC) tablet 10 mg (10 mg Oral Given 7/30/17 1339)       IMPRESSION:  1. Essential hypertension    2. Nicotine dependence, uncomplicated, unspecified nicotine product type    3. Alcohol abuse, daily use    4. Non compliance w medication regimen        PLAN:  1. Discharge Medication List as of 7/30/2017  2:53 PM      CONTINUE these medications which have CHANGED    Details   amLODIPine (NORVASC) 10 mg tablet Take 1 Tab by mouth daily (with dinner). Takes at 1600 daily. , Print, Disp-30 Tab, R-0         CONTINUE these medications which have NOT CHANGED    Details   albuterol (PROVENTIL HFA, VENTOLIN HFA, PROAIR HFA) 90 mcg/actuation inhaler Take 2 Puffs by inhalation every six (6) hours as needed for Wheezing., Historical Med      acetaminophen-codeine (TYLENOL-CODEINE #3) 300-30 mg per tablet Take 1 Tab by mouth every four (4) hours as needed for Pain., Historical Med           2. Follow-up Information     Follow up With Details Comments Contact Info    Jose Luis Gutiérrez MD Schedule an appointment as soon as possible for a visit 2-4 days for recheck [address] [place] [address]  [phone]          Return to ED if worse     2:53 PM  Pt has been reexamined. Pt has no new complaints, changes or physical findings. Care plan outlined and precautions discussed. All available results were reviewed with pt. All medications were reviewed with pt. All of pt's questions and concerns were addressed. Pt agrees to F/U as instructed and agrees to return to ED upon further deterioration. Pt is ready to go home.   TC Barton

## 2017-07-30 NOTE — DISCHARGE INSTRUCTIONS
High Blood Pressure: Care Instructions  Your Care Instructions  If your blood pressure is usually above 140/90, you have high blood pressure, or hypertension. That means the top number is 140 or higher or the bottom number is 90 or higher, or both. Despite what a lot of people think, high blood pressure usually doesn't cause headaches or make you feel dizzy or lightheaded. It usually has no symptoms. But it does increase your risk for heart attack, stroke, and kidney or eye damage. The higher your blood pressure, the more your risk increases. Your doctor will give you a goal for your blood pressure. Your goal will be based on your health and your age. An example of a goal is to keep your blood pressure below 140/90. Lifestyle changes, such as eating healthy and being active, are always important to help lower blood pressure. You might also take medicine to reach your blood pressure goal.  Follow-up care is a key part of your treatment and safety. Be sure to make and go to all appointments, and call your doctor if you are having problems. It's also a good idea to know your test results and keep a list of the medicines you take. How can you care for yourself at home? Medical treatment  · If you stop taking your medicine, your blood pressure will go back up. You may take one or more types of medicine to lower your blood pressure. Be safe with medicines. Take your medicine exactly as prescribed. Call your doctor if you think you are having a problem with your medicine. · Talk to your doctor before you start taking aspirin every day. Aspirin can help certain people lower their risk of a heart attack or stroke. But taking aspirin isn't right for everyone, because it can cause serious bleeding. · See your doctor regularly. You may need to see the doctor more often at first or until your blood pressure comes down.   · If you are taking blood pressure medicine, talk to your doctor before you take decongestants or anti-inflammatory medicine, such as ibuprofen. Some of these medicines can raise blood pressure. · Learn how to check your blood pressure at home. Lifestyle changes  · Stay at a healthy weight. This is especially important if you put on weight around the waist. Losing even 10 pounds can help you lower your blood pressure. · If your doctor recommends it, get more exercise. Walking is a good choice. Bit by bit, increase the amount you walk every day. Try for at least 30 minutes on most days of the week. You also may want to swim, bike, or do other activities. · Avoid or limit alcohol. Talk to your doctor about whether you can drink any alcohol. · Try to limit how much sodium you eat to less than 2,300 milligrams (mg) a day. Your doctor may ask you to try to eat less than 1,500 mg a day. · Eat plenty of fruits (such as bananas and oranges), vegetables, legumes, whole grains, and low-fat dairy products. · Lower the amount of saturated fat in your diet. Saturated fat is found in animal products such as milk, cheese, and meat. Limiting these foods may help you lose weight and also lower your risk for heart disease. · Do not smoke. Smoking increases your risk for heart attack and stroke. If you need help quitting, talk to your doctor about stop-smoking programs and medicines. These can increase your chances of quitting for good. When should you call for help? Call 911 anytime you think you may need emergency care. This may mean having symptoms that suggest that your blood pressure is causing a serious heart or blood vessel problem. Your blood pressure may be over 180/110. For example, call 911 if:  · You have symptoms of a heart attack. These may include:  ¨ Chest pain or pressure, or a strange feeling in the chest.  ¨ Sweating. ¨ Shortness of breath. ¨ Nausea or vomiting. ¨ Pain, pressure, or a strange feeling in the back, neck, jaw, or upper belly or in one or both shoulders or arms.   ¨ Lightheadedness or sudden weakness. ¨ A fast or irregular heartbeat. · You have symptoms of a stroke. These may include:  ¨ Sudden numbness, tingling, weakness, or loss of movement in your face, arm, or leg, especially on only one side of your body. ¨ Sudden vision changes. ¨ Sudden trouble speaking. ¨ Sudden confusion or trouble understanding simple statements. ¨ Sudden problems with walking or balance. ¨ A sudden, severe headache that is different from past headaches. · You have severe back or belly pain. Do not wait until your blood pressure comes down on its own. Get help right away. Call your doctor now or seek immediate care if:  · Your blood pressure is much higher than normal (such as 180/110 or higher), but you don't have symptoms. · You think high blood pressure is causing symptoms, such as:  ¨ Severe headache. ¨ Blurry vision. Watch closely for changes in your health, and be sure to contact your doctor if:  · Your blood pressure measures 140/90 or higher at least 2 times. That means the top number is 140 or higher or the bottom number is 90 or higher, or both. · You think you may be having side effects from your blood pressure medicine. · Your blood pressure is usually normal, but it goes above normal at least 2 times. Where can you learn more? Go to http://kathy-emile.info/. Enter E253 in the search box to learn more about \"High Blood Pressure: Care Instructions. \"  Current as of: August 8, 2016  Content Version: 11.3  © 7124-2827 [employer]. Care instructions adapted under license by [employer] (which disclaims liability or warranty for this information). If you have questions about a medical condition or this instruction, always ask your healthcare professional. [place] [address] any warranty or liability for your use of this information. Learning About Benefits From Quitting Smoking  How does quitting smoking make you healthier?   If you're thinking about quitting smoking, you may have a few reasons to be smoke-free. Your health may be one of them. · When you quit smoking, you lower your risks for cancer, lung disease, heart attack, stroke, blood vessel disease, and blindness from macular degeneration. · When you're smoke-free, you get sick less often, and you heal faster. You are less likely to get colds, flu, bronchitis, and pneumonia. · As a nonsmoker, you may find that your mood is better and you are less stressed. When and how will you feel healthier? Quitting has real health benefits that start from day 1 of being smoke-free. And the longer you stay smoke-free, the healthier you get and the better you feel. The first hours  · After just 20 minutes, your blood pressure and heart rate go down. That means there's less stress on your heart and blood vessels. · Within 12 hours, the level of carbon monoxide in your blood drops back to normal. That makes room for more oxygen. With more oxygen in your body, you may notice that you have more energy than when you smoked. After 2 weeks  · Your lungs start to work better. · Your risk of heart attack starts to drop. After 1 month  · When your lungs are clear, you cough less and breathe deeper, so it's easier to be active. · Your sense of taste and smell return. That means you can enjoy food more than you have since you started smoking. Over the years  · After 1 year, your risk of heart disease is half what it would be if you kept smoking. · After 5 years, your risk of stroke starts to shrink. Within a few years after that, it's about the same as if you'd never smoked. · After 10 years, your risk of dying from lung cancer is cut by about half. And your risk for many other types of cancer is lower too. How would quitting help others in your life? When you quit smoking, you improve the health of everyone who now breathes in your smoke.   · Their heart, lung, and cancer risks drop, much like yours.  · They are sick less. For babies and small children, living smoke-free means they're less likely to have ear infections, pneumonia, and bronchitis. · If you're a woman who is or will be pregnant someday, quitting smoking means a healthier . · Children who are close to you are less likely to become adult smokers. Where can you learn more? Go to http://kathy-emile.info/. Enter [id number] [id number] in the search box to learn more about \"Learning About Benefits From Quitting Smoking. \"  Current as of: 2017  Content Version: 11.3  © 3534-2246 [employer]. Care instructions adapted under license by [employer] (which disclaims liability or warranty for this information). If you have questions about a medical condition or this instruction, always ask your healthcare professional. [address] any warranty or liability for your use of this information. Learning About Alcohol Misuse  What is alcohol misuse? Alcohol misuse means drinking so much that it causes problems for you or others. Early problems with alcohol can start at home. You may argue with loved ones about how much you're drinking. Your job may be affected because of drinking. You may drink when it's dangerous or illegal, such as when you drive. Drinking too much for a long time can lead to health conditions like high blood pressure and liver problems. What are the symptoms? Symptoms of alcohol misuse may include:  · Drinking much more than you planned. · Drinking even though it's causing problems for you or others. · Putting yourself in situations where you might get hurt. · Wanting to cut down or stop drinking, but not being able to. · Feeling guilty about how much you're drinking. How is alcohol misuse treated? Getting help for problems with alcohol is up to you. But you don't have to do it alone.  There are many people and kinds of treatments to help with alcohol problems. Talking to your doctor is the first step. When you get a doctor's help, treatment for alcohol problems can be safer and quicker. Treatment options can include:  · Treatment programs. Examples are group therapy, one or more types of counseling, and alcohol education. · Medicines. A doctor or counselor can help you know what kinds of medicines might help with cravings. · Free social support groups. These groups include AA (Alcoholics Anonymous) and SMART (Self-Management and Recovery Training). Your doctor can help you decide which type of program is best for you. Follow-up care is a key part of your treatment and safety. Be sure to make and go to all appointments, and call your doctor if you are having problems. It's also a good idea to know your test results and keep a list of the medicines you take. Where can you learn more? Go to http://kathyRed Swooshemile.info/. Enter [id number] in the search box to learn more about \"Learning About Alcohol Misuse. \"  Current as of: January 3, 2017  Content Version: 11.3  © 2835-8937 [employer]. Care instructions adapted under license by [employer] (which disclaims liability or warranty for this information). If you have questions about a medical condition or this instruction, always ask your healthcare professional. [address] any warranty or liability for your use of this information. We hope that we have addressed all of your medical concerns. The examination and treatment you received in the Emergency Department were for an emergent problem and were not intended as complete care. It is important that you follow up with your healthcare provider(s) for ongoing care. If your symptoms worsen or do not improve as expected, and you are unable to reach your usual health care provider(s), you should return to the Emergency Department.       Today's healthcare is undergoing tremendous change, and patient satisfaction surveys are one of the many tools to assess the quality of medical care. You may receive a survey from the [employer] regarding your experience in the Emergency Department. I hope that your experience has been completely positive, particularly the medical care that I provided. As such, please participate in the survey; anything less than excellent does not meet my expectations or intentions. [facility] and [facility] participate in nationally recognized quality of care measures. If your blood pressure is greater than 120/80, as reported below, we urge that you seek medical care to address the potential of high blood pressure, commonly known as hypertension. Hypertension can be hereditary or can be caused by certain medical conditions, pain, stress, or \"white coat syndrome. \"       Please make an appointment with your health care provider(s) for follow up of your Emergency Department visit. VITALS:   Patient Vitals for the past 8 hrs:   Temp Pulse Resp BP SpO2   07/30/17 1430 - - - 182/89 100 %   07/30/17 1415 - - - (!) 167/92 99 %   07/30/17 1405 - - - - 97 %   07/30/17 1404 - - - (!) 188/119 -   07/30/17 1306 97.9 °F (36.6 °C) 92 16 (!) 209/100 99 %          Thank you for allowing us to provide you with medical care today. We realize that you have many choices for your emergency care needs. Please choose us in the future for any continued health care needs. Pollo Talley, [address]: [phone]            Recent Results (from the past 24 hour(s))   CBC WITH AUTOMATED DIFF    Collection Time: 07/30/17  1:36 PM   Result Value Ref Range    WBC 6.8 4.1 - 11.1 K/uL    RBC 4.95 4.10 - 5.70 M/uL    HGB 14.9 12.1 - 17.0 g/dL    HCT 44.6 36.6 - 50.3 %    MCV 90.1 80.0 - 99.0 FL    MCH 30.1 26.0 - 34.0 PG    MCHC 33.4 30.0 - 36.5 g/dL    RDW 14.1 11.5 - 14.5 %    PLATELET 198 150 - 400 K/uL    NEUTROPHILS 72 32 - 75 %    LYMPHOCYTES 16 12 - 49 %    MONOCYTES 9 5 - 13 %    EOSINOPHILS 2 0 - 7 %    BASOPHILS 1 0 - 1 %    ABS. NEUTROPHILS 4.9 1.8 - 8.0 K/UL    ABS. LYMPHOCYTES 1.1 0.8 - 3.5 K/UL    ABS. MONOCYTES 0.6 0.0 - 1.0 K/UL    ABS. EOSINOPHILS 0.2 0.0 - 0.4 K/UL    ABS. BASOPHILS 0.1 0.0 - 0.1 K/UL   METABOLIC PANEL, COMPREHENSIVE    Collection Time: 07/30/17  1:36 PM   Result Value Ref Range    Sodium 135 (L) 136 - 145 mmol/L    Potassium 4.1 3.5 - 5.1 mmol/L    Chloride 100 97 - 108 mmol/L    CO2 26 21 - 32 mmol/L    Anion gap 9 5 - 15 mmol/L    Glucose 106 (H) 65 - 100 mg/dL    BUN 13 6 - 20 MG/DL    Creatinine 1.10 0.70 - 1.30 MG/DL    BUN/Creatinine ratio 12 12 - 20      GFR est AA >60 >60 ml/min/1.73m2    GFR est non-AA >60 >60 ml/min/1.73m2    Calcium 8.5 8.5 - 10.1 MG/DL    Bilirubin, total 0.6 0.2 - 1.0 MG/DL    ALT (SGPT) 23 12 - 78 U/L    AST (SGOT) 25 15 - 37 U/L    Alk. phosphatase 41 (L) 45 - 117 U/L    Protein, total 7.0 6.4 - 8.2 g/dL    Albumin 3.6 3.5 - 5.0 g/dL    Globulin 3.4 2.0 - 4.0 g/dL    A-G Ratio 1.1 1.1 - 2.2     MAGNESIUM    Collection Time: 07/30/17  1:36 PM   Result Value Ref Range    Magnesium 1.8 1.6 - 2.4 mg/dL       No results found.

## 2022-01-29 ENCOUNTER — APPOINTMENT (OUTPATIENT)
Dept: GENERAL RADIOLOGY | Age: 62
End: 2022-01-29
Attending: STUDENT IN AN ORGANIZED HEALTH CARE EDUCATION/TRAINING PROGRAM
Payer: COMMERCIAL

## 2022-01-29 ENCOUNTER — HOSPITAL ENCOUNTER (EMERGENCY)
Age: 62
Discharge: HOME | End: 2022-01-29
Attending: EMERGENCY MEDICINE
Payer: COMMERCIAL

## 2022-01-29 ENCOUNTER — APPOINTMENT (OUTPATIENT)
Dept: GENERAL RADIOLOGY | Age: 62
End: 2022-01-29
Attending: NURSE PRACTITIONER
Payer: COMMERCIAL

## 2022-01-29 VITALS
HEIGHT: 70 IN | TEMPERATURE: 97.7 F | OXYGEN SATURATION: 99 % | WEIGHT: 137 LBS | SYSTOLIC BLOOD PRESSURE: 134 MMHG | RESPIRATION RATE: 16 BRPM | HEART RATE: 65 BPM | BODY MASS INDEX: 19.61 KG/M2 | DIASTOLIC BLOOD PRESSURE: 75 MMHG

## 2022-01-29 DIAGNOSIS — S52.532A CLOSED COLLES' FRACTURE OF LEFT RADIUS, INITIAL ENCOUNTER: Primary | ICD-10-CM

## 2022-01-29 DIAGNOSIS — M25.552 LEFT HIP PAIN: ICD-10-CM
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
## 2022-01-29 PROCEDURE — 74011000250 HC RX REV CODE- 250: Performed by: STUDENT IN AN ORGANIZED HEALTH CARE EDUCATION/TRAINING PROGRAM

## 2022-01-29 PROCEDURE — 99283 EMERGENCY DEPT VISIT LOW MDM: CPT

## 2022-01-29 PROCEDURE — 75810000302 HC ER LEVEL 2 CLOSED TREATMNT FRACTURE/DISLOCATION

## 2022-01-29 PROCEDURE — 73100 X-RAY EXAM OF WRIST: CPT

## 2022-01-29 PROCEDURE — 74011250637 HC RX REV CODE- 250/637: Performed by: STUDENT IN AN ORGANIZED HEALTH CARE EDUCATION/TRAINING PROGRAM

## 2022-01-29 PROCEDURE — 73110 X-RAY EXAM OF WRIST: CPT

## 2022-01-29 PROCEDURE — 73502 X-RAY EXAM HIP UNI 2-3 VIEWS: CPT

## 2022-01-29 RX ORDER — LIDOCAINE HYDROCHLORIDE 10 MG/ML
5 INJECTION, SOLUTION EPIDURAL; INFILTRATION; INTRACAUDAL; PERINEURAL ONCE
Status: COMPLETED | OUTPATIENT
Start: 2022-01-29 | End: 2022-01-29

## 2022-01-29 RX ORDER — KETOROLAC TROMETHAMINE 10 MG/1
10 TABLET, FILM COATED ORAL
Qty: 20 TABLET | Refills: 0 | Status: SHIPPED | OUTPATIENT
Start: 2022-01-29 | End: 2022-01-29

## 2022-01-29 RX ORDER — BUPIVACAINE HYDROCHLORIDE 5 MG/ML
5 INJECTION, SOLUTION EPIDURAL; INTRACAUDAL
Status: COMPLETED | OUTPATIENT
Start: 2022-01-29 | End: 2022-01-29

## 2022-01-29 RX ORDER — HYDROCODONE BITARTRATE AND ACETAMINOPHEN 5; 325 MG/1; MG/1
1 TABLET ORAL
Qty: 12 TABLET | Refills: 0 | Status: SHIPPED | OUTPATIENT
Start: 2022-01-29 | End: 2022-02-01

## 2022-01-29 RX ORDER — HYDROCODONE BITARTRATE AND ACETAMINOPHEN 7.5; 325 MG/1; MG/1
1 TABLET ORAL ONCE
Status: COMPLETED | OUTPATIENT
Start: 2022-01-29 | End: 2022-01-29

## 2022-01-29 RX ADMIN — BUPIVACAINE HYDROCHLORIDE 25 MG: 5 INJECTION, SOLUTION EPIDURAL; INTRACAUDAL at 12:24

## 2022-01-29 RX ADMIN — LIDOCAINE HYDROCHLORIDE 5 ML: 10 INJECTION, SOLUTION EPIDURAL; INFILTRATION; INTRACAUDAL; PERINEURAL at 12:24

## 2022-01-29 RX ADMIN — HYDROCODONE BITARTRATE AND ACETAMINOPHEN 1 TABLET: 7.5; 325 TABLET ORAL at 11:11

## 2022-01-29 NOTE — ED TRIAGE NOTES
Pt states he fell on driveway this am injuring his left wrist. + deformity. Pt also report left hip pain.

## 2022-01-29 NOTE — PROCEDURES
PROCEDURE NOTE - FRACTURE REDUCTION: The patient was induced with a hematoma block. After it was confirmed that appropriate sedation had been reached, a longitudinal traction in conjunction with re-creation of the injury maneuver was applied reducing the fracture. Subsequently, sugar tong splint was applied. The patient was aroused from anesthesia and tolerated the procedure well. Post-reduction plain films reviewed with confirmation of a satisfactory reduction of the fracture. The extremity was neurovascularly intact post reduction and splint placement.

## 2022-01-29 NOTE — DISCHARGE INSTRUCTIONS
Keep wrist in splint until you are able to follow-up with a orthopedic doctor. Take medication for pain as needed and can ice as needed.

## 2022-01-29 NOTE — ED PROVIDER NOTES
24-year-old male with history of COPD and HTN presents to ED with left wrist and left hip pain status post fall. Patient reports that prior to arrival he was walking his dog and slipped on ice in his driveway. He fell on his left side and immediately noticed pain to his left wrist and left hip. He does report history of surgery to his left hip with \"rods and pins\". He noticed since arriving that his wrist is started to swell and looks \"abnormal\". He has not taken anything for his pain. He denies any head trauma or LOC. He denies any fevers, chills, numbness, tingling, vomiting or diarrhea. He does report some nausea secondary to the pain. The history is provided by the patient. Wrist Pain  Pertinent negatives include no chest pain and no headaches. Hip Injury  Pertinent negatives include no chest pain and no headaches. Fall  Pertinent negatives include no chest pain and no headaches. Past Medical History:   Diagnosis Date    Chronic obstructive pulmonary disease ([facility] [address]     per xray report 3/24/16. PCP Dr. Rosa Davis Chronic pain     tamera feet, tamera knees    Hypertension        Past Surgical History:   Procedure Laterality Date    HX ORTHOPAEDIC      pins in left hip due to growth delay at age 11   [de-identified] ORTHOPAEDIC      HX TONSILLECTOMY      HX UROLOGICAL      testicular surgery to \"clean a gland out\", 30 years ago    WRIST ARTHROSCOP,CLEAN/DRAIN      traumatic wrist injury, tendons were repaired         No family history on file. Social History     Socioeconomic History    Marital status:      Spouse name: Not on file    Number of children: Not on file    Years of education: Not on file    Highest education level: Not on file   Occupational History    Not on file   Tobacco Use    Smoking status: Current Every Day Smoker     Packs/day: 0.50    Smokeless tobacco: Never Used   Substance and Sexual Activity    Alcohol use:  Yes     Alcohol/week: 6.0 - 7.0 standard drinks Types: 6 - 7 Cans of beer per week     Comment: 42-49 / week     Drug use: No     Comment: Past HX:  cocaine    Sexual activity: Yes     Partners: Female   Other Topics Concern    Not on file   Social History Narrative    Not on file     Social Determinants of Health     Financial Resource Strain:     Difficulty of Paying Living Expenses: Not on file   Food Insecurity:     Worried About Running Out of Food in the Last Year: Not on file    Rashard of Food in the Last Year: Not on file   Transportation Needs:     Lack of Transportation (Medical): Not on file    Lack of Transportation (Non-Medical): Not on file   Physical Activity:     Days of Exercise per Week: Not on file    Minutes of Exercise per Session: Not on file   Stress:     Feeling of Stress : Not on file   Social Connections:     Frequency of Communication with Friends and Family: Not on file    Frequency of Social Gatherings with Friends and Family: Not on file    Attends [employer] Services: Not on file    Active Member of [address] [place] [address] [employer] or Organizations: Not on file    Attends Club or Organization Meetings: Not on file    Marital Status: Not on file   Intimate Partner Violence:     Fear of Current or Ex-Partner: Not on file    Emotionally Abused: Not on file    Physically Abused: Not on file    Sexually Abused: Not on file   Housing Stability:     Unable to Pay for Housing in the Last Year: Not on file    Number of [place] in the Last Year: Not on file    Unstable Housing in the Last Year: Not on file         ALLERGIES: Adhesive tape-silicones    Review of Systems   Constitutional: Negative for fever. HENT: Negative for congestion and sinus pain. Respiratory: Negative for cough. Cardiovascular: Negative for chest pain. Gastrointestinal: Negative for nausea and vomiting. Genitourinary: Negative for dysuria. Musculoskeletal: Positive for arthralgias and joint swelling. Negative for myalgias.    Neurological: Negative for headaches. Hematological: Negative for adenopathy. All other systems reviewed and are negative. There were no vitals filed for this visit. Physical Exam  Vitals and nursing note reviewed. Constitutional:       Appearance: Normal appearance. Eyes:      Extraocular Movements: Extraocular movements intact. Pupils: Pupils are equal, round, and reactive to light. Cardiovascular:      Rate and Rhythm: Normal rate and regular rhythm. Heart sounds: Normal heart sounds. Pulmonary:      Effort: Pulmonary effort is normal.      Breath sounds: Normal breath sounds. Abdominal:      Palpations: Abdomen is soft. Tenderness: There is no abdominal tenderness. Musculoskeletal:      Right wrist: Normal.      Left wrist: Swelling, deformity and tenderness present. Decreased range of motion. Right hip: Normal.      Left hip: Bony tenderness present. No deformity. Normal range of motion. Lymphadenopathy:      Cervical: No cervical adenopathy. Skin:     General: Skin is warm and dry. Neurological:      General: No focal deficit present. Mental Status: He is alert and oriented to person, place, and time. Psychiatric:         Mood and Affect: Mood normal.         Behavior: Behavior normal.          MDM  Number of Diagnoses or Management Options  Closed Colles' fracture of left radius, initial encounter  Fall, initial encounter  Left hip pain  Diagnosis management comments: 68-year-old male with history of COPD and HTN presents to ED with left wrist and left hip pain status post fall. Physical exam revealed left wrist swelling, deformity, decreased range of motion and tenderness. Also tenderness to left hip. X-ray of left hip showed chronic left femur fracture status post ORIF with no identified acute fracture. X-ray of left wrist revealed acute slightly impacted and slightly dorsally angulated distal radius fracture with associated ulnar styloid process fracture.   Appreciate help from Ortho. Brock Leiva NP helped with reduction and splinted arm. Patient will be instructed for follow-up with Ortho. He will be discharged with instructions for conservative care and follow-up. Amount and/or Complexity of Data Reviewed  Tests in the radiology section of CPT®: ordered and reviewed  Discuss the patient with other providers: yes      ED Course as of 01/29/22 1250   Sat Jan 29, 2022   1155 Consulted with Dr. Josh Ribeiro ortho on call who recommended reduction and hematoma block, sugar tong splint and follow up in office next week.   [AM]   [address] at bedside to help with reduction [AM]      ED Course User Index  [AM] Becca Omalley, [address]       Procedures

## 2022-01-29 NOTE — CONSULTS
ORTHOPAEDIC CONSULT NOTE    Subjective:     Date of Consultation:  January 29, 2022      Phylicia Hall is a 59 y.o. male who is being seen for L wrist pain with deformity s/p slip and fall this AM, pt reports he was out walking his dog when he slipped on the ice. ED workup has reveled a displaced L distal radius fracture. Pt is R hand dominant. There are no problems to display for this patient. No family history on file. Social History     Tobacco Use    Smoking status: Current Every Day Smoker     Packs/day: 0.50    Smokeless tobacco: Never Used   Substance Use Topics    Alcohol use: Yes     Alcohol/week: 6.0 - 7.0 standard drinks     Types: 6 - 7 Cans of beer per week     Comment: 42-49 / week      Past Medical History:   Diagnosis Date    Chronic obstructive pulmonary disease ([address]     per xray report 3/24/16. PCP Dr. Damian Harding Chronic pain     tamera feet, tamera knees    Hypertension       Past Surgical History:   Procedure Laterality Date    HX ORTHOPAEDIC      pins in left hip due to growth delay at age 11   [de-identified] ORTHOPAEDIC      HX TONSILLECTOMY      HX UROLOGICAL      testicular surgery to \"clean a gland out\", 30 years ago    WRIST ARTHROSCOP,CLEAN/DRAIN      traumatic wrist injury, tendons were repaired      Prior to Admission medications    Medication Sig Start Date End Date Taking? Authorizing Provider   amLODIPine (NORVASC) 10 mg tablet Take 1 Tab by mouth daily (with dinner). Takes at 1600 daily. 7/30/17   Rivera Talley PA   acetaminophen-codeine (TYLENOL-CODEINE #3) 300-30 mg per tablet Take 1 Tab by mouth every four (4) hours as needed for Pain. Provider, Historical   albuterol (PROVENTIL HFA, VENTOLIN HFA, PROAIR HFA) 90 mcg/actuation inhaler Take 2 Puffs by inhalation every six (6) hours as needed for Wheezing.     Provider, Historical     Current Facility-Administered Medications   Medication Dose Route Frequency    lidocaine (PF) (XYLOCAINE) 10 mg/mL (1 %) injection 5 mL  5 mL IntraDERMal ONCE    bupivacaine (PF) (MARCAINE) 0.5 % (5 mg/mL) injection 25 mg  5 mL Peripheral Nerve Block NOW     Current Outpatient Medications   Medication Sig    amLODIPine (NORVASC) 10 mg tablet Take 1 Tab by mouth daily (with dinner). Takes at 1600 daily.  acetaminophen-codeine (TYLENOL-CODEINE #3) 300-30 mg per tablet Take 1 Tab by mouth every four (4) hours as needed for Pain.  albuterol (PROVENTIL HFA, VENTOLIN HFA, PROAIR HFA) 90 mcg/actuation inhaler Take 2 Puffs by inhalation every six (6) hours as needed for Wheezing. Allergies   Allergen Reactions    Adhesive Tape-Silicones Rash     Please use cloth tape          Review of Systems:  A comprehensive review of systems was negative except for that written in the HPI. Mental Status: no dementia    Objective:     Patient Vitals for the past 8 hrs:   BP Temp Pulse Resp SpO2 Height Weight   22 1059 (!) 107/55 97.7 °F (36.5 °C) 65 16 99 % 5' 10\" (1.778 m) 62.1 kg (137 lb)     Temp (24hrs), Av.7 °F (36.5 °C), Min:97.7 °F (36.5 °C), Max:97.7 °F (36.5 °C)      Gen: Well-developed,  in no acute distress   Musc: LUE - + deformity of distal radius, able to move figners, NVI    Skin: No skin breakdown noted. Skin warm, pink, dry  Neuro: Cranial nerves are grossly intact, no focal motor weakness, follows commands appropriately   Psych: Good insight, oriented to person, place and time, alert    Imaging Review: XRs reviewed     Labs: No results found for this or any previous visit (from the past 24 hour(s)). Impression:   There are no problems to display for this patient. Active Problems:    * No active hospital problems. *      Plan:   -  R displaced distal radius fracture - reduction in the ED, see procedure note, ice and elevate, follow up Dr. Adam Torres this week. Dr. Adam Torres aware and agrees with plan as above.         Nuha Carey, NP  Orthopedic Nurse Practitioner   [place]

## 2022-07-25 ENCOUNTER — HOSPITAL ENCOUNTER (INPATIENT)
Age: 62
LOS: 4 days | Discharge: HOME | DRG: 682 | End: 2022-07-29
Attending: EMERGENCY MEDICINE | Admitting: STUDENT IN AN ORGANIZED HEALTH CARE EDUCATION/TRAINING PROGRAM
Payer: COMMERCIAL

## 2022-07-25 ENCOUNTER — APPOINTMENT (OUTPATIENT)
Dept: CT IMAGING | Age: 62
DRG: 682 | End: 2022-07-25
Attending: EMERGENCY MEDICINE
Payer: COMMERCIAL

## 2022-07-25 DIAGNOSIS — N17.9 ACUTE RENAL FAILURE, UNSPECIFIED ACUTE RENAL FAILURE TYPE (HCC): Primary | ICD-10-CM

## 2022-07-25 DIAGNOSIS — R63.4 WEIGHT LOSS, NON-INTENTIONAL: ICD-10-CM

## 2022-07-25 DIAGNOSIS — R31.9 HEMATURIA, UNSPECIFIED TYPE: ICD-10-CM

## 2022-07-25 DIAGNOSIS — R10.84 GENERALIZED ABDOMINAL PAIN: ICD-10-CM

## 2022-07-25 DIAGNOSIS — N28.89 RENAL MASS: ICD-10-CM

## 2022-07-25 DIAGNOSIS — R74.8 ELEVATED CK: ICD-10-CM

## 2022-07-25 LAB
ALBUMIN SERPL-MCNC: 3.8 G/DL (ref 3.5–5)
ALBUMIN/GLOB SERPL: 1.2 {RATIO} (ref 1.1–2.2)
ALP SERPL-CCNC: 47 U/L (ref 45–117)
ALT SERPL-CCNC: 24 U/L (ref 12–78)
ANION GAP SERPL CALC-SCNC: 6 MMOL/L (ref 5–15)
APPEARANCE UR: ABNORMAL
AST SERPL-CCNC: 29 U/L (ref 15–37)
BACTERIA URNS QL MICRO: ABNORMAL /HPF
BASOPHILS # BLD: 0.1 K/UL (ref 0–0.1)
BASOPHILS NFR BLD: 1 % (ref 0–1)
BILIRUB SERPL-MCNC: 0.3 MG/DL (ref 0.2–1)
BILIRUB UR QL: NEGATIVE
BUN SERPL-MCNC: 29 MG/DL (ref 6–20)
BUN/CREAT SERPL: 8 (ref 12–20)
CALCIUM SERPL-MCNC: 9.1 MG/DL (ref 8.5–10.1)
CHLORIDE SERPL-SCNC: 101 MMOL/L (ref 97–108)
CK SERPL-CCNC: 431 U/L (ref 39–308)
CO2 SERPL-SCNC: 28 MMOL/L (ref 21–32)
COLOR UR: ABNORMAL
COMMENT, HOLDF: NORMAL
CREAT SERPL-MCNC: 3.69 MG/DL (ref 0.7–1.3)
DIFFERENTIAL METHOD BLD: ABNORMAL
EOSINOPHIL # BLD: 0.1 K/UL (ref 0–0.4)
EOSINOPHIL NFR BLD: 1 % (ref 0–7)
EPITH CASTS URNS QL MICRO: ABNORMAL /LPF
ERYTHROCYTE [DISTWIDTH] IN BLOOD BY AUTOMATED COUNT: 14.6 % (ref 11.5–14.5)
GLOBULIN SER CALC-MCNC: 3.3 G/DL (ref 2–4)
GLUCOSE SERPL-MCNC: 136 MG/DL (ref 65–100)
GLUCOSE UR STRIP.AUTO-MCNC: 100 MG/DL
HCT VFR BLD AUTO: 42.9 % (ref 36.6–50.3)
HGB BLD-MCNC: 14.4 G/DL (ref 12.1–17)
HGB UR QL STRIP: ABNORMAL
IMM GRANULOCYTES # BLD AUTO: 0 K/UL (ref 0–0.04)
IMM GRANULOCYTES NFR BLD AUTO: 0 % (ref 0–0.5)
KETONES UR QL STRIP.AUTO: NEGATIVE MG/DL
LEUKOCYTE ESTERASE UR QL STRIP.AUTO: NEGATIVE
LYMPHOCYTES # BLD: 0.9 K/UL (ref 0.8–3.5)
LYMPHOCYTES NFR BLD: 11 % (ref 12–49)
MCH RBC QN AUTO: 29.5 PG (ref 26–34)
MCHC RBC AUTO-ENTMCNC: 33.6 G/DL (ref 30–36.5)
MCV RBC AUTO: 87.9 FL (ref 80–99)
MONOCYTES # BLD: 0.8 K/UL (ref 0–1)
MONOCYTES NFR BLD: 10 % (ref 5–13)
NEUTS SEG # BLD: 6.4 K/UL (ref 1.8–8)
NEUTS SEG NFR BLD: 77 % (ref 32–75)
NITRITE UR QL STRIP.AUTO: NEGATIVE
NRBC # BLD: 0 K/UL (ref 0–0.01)
NRBC BLD-RTO: 0 PER 100 WBC
PH UR STRIP: 7 [PH] (ref 5–8)
PLATELET # BLD AUTO: 200 K/UL (ref 150–400)
PMV BLD AUTO: 8.9 FL (ref 8.9–12.9)
POTASSIUM SERPL-SCNC: 3.6 MMOL/L (ref 3.5–5.1)
PROT SERPL-MCNC: 7.1 G/DL (ref 6.4–8.2)
PROT UR STRIP-MCNC: >300 MG/DL
RBC # BLD AUTO: 4.88 M/UL (ref 4.1–5.7)
RBC #/AREA URNS HPF: ABNORMAL /HPF (ref 0–5)
SAMPLES BEING HELD,HOLD: NORMAL
SODIUM SERPL-SCNC: 135 MMOL/L (ref 136–145)
SP GR UR REFRACTOMETRY: 1.01 (ref 1–1.03)
UR CULT HOLD, URHOLD: NORMAL
UROBILINOGEN UR QL STRIP.AUTO: 0.2 EU/DL (ref 0.2–1)
WBC # BLD AUTO: 8.2 K/UL (ref 4.1–11.1)
WBC URNS QL MICRO: ABNORMAL /HPF (ref 0–4)

## 2022-07-25 PROCEDURE — 80053 COMPREHEN METABOLIC PANEL: CPT

## 2022-07-25 PROCEDURE — 65270000029 HC RM PRIVATE

## 2022-07-25 PROCEDURE — 51798 US URINE CAPACITY MEASURE: CPT

## 2022-07-25 PROCEDURE — 74176 CT ABD & PELVIS W/O CONTRAST: CPT

## 2022-07-25 PROCEDURE — 81001 URINALYSIS AUTO W/SCOPE: CPT

## 2022-07-25 PROCEDURE — 99285 EMERGENCY DEPT VISIT HI MDM: CPT

## 2022-07-25 PROCEDURE — 87086 URINE CULTURE/COLONY COUNT: CPT

## 2022-07-25 PROCEDURE — 74011250636 HC RX REV CODE- 250/636: Performed by: EMERGENCY MEDICINE

## 2022-07-25 PROCEDURE — 74011250636 HC RX REV CODE- 250/636: Performed by: PHYSICIAN ASSISTANT

## 2022-07-25 PROCEDURE — 82550 ASSAY OF CK (CPK): CPT

## 2022-07-25 PROCEDURE — 85025 COMPLETE CBC W/AUTO DIFF WBC: CPT

## 2022-07-25 PROCEDURE — 96375 TX/PRO/DX INJ NEW DRUG ADDON: CPT

## 2022-07-25 PROCEDURE — 36415 COLL VENOUS BLD VENIPUNCTURE: CPT

## 2022-07-25 PROCEDURE — 96374 THER/PROPH/DIAG INJ IV PUSH: CPT

## 2022-07-25 RX ORDER — CITALOPRAM 20 MG/1
20 TABLET, FILM COATED ORAL DAILY
Status: ON HOLD | COMMUNITY
Start: 2022-06-28 | End: 2022-07-29 | Stop reason: SDUPTHER

## 2022-07-25 RX ORDER — ONDANSETRON 2 MG/ML
4 INJECTION INTRAMUSCULAR; INTRAVENOUS
Status: COMPLETED | OUTPATIENT
Start: 2022-07-25 | End: 2022-07-25

## 2022-07-25 RX ORDER — MORPHINE SULFATE 4 MG/ML
4 INJECTION INTRAVENOUS
Status: COMPLETED | OUTPATIENT
Start: 2022-07-25 | End: 2022-07-25

## 2022-07-25 RX ORDER — HYDROCODONE BITARTRATE AND ACETAMINOPHEN 5; 325 MG/1; MG/1
1 TABLET ORAL
Status: ON HOLD | COMMUNITY
Start: 2022-03-14 | End: 2022-07-29 | Stop reason: SDUPTHER

## 2022-07-25 RX ORDER — TRAZODONE HYDROCHLORIDE 50 MG/1
50 TABLET ORAL 2 TIMES DAILY
Status: ON HOLD | COMMUNITY
Start: 2022-06-28 | End: 2022-07-29 | Stop reason: SDUPTHER

## 2022-07-25 RX ADMIN — ONDANSETRON 4 MG: 2 INJECTION INTRAMUSCULAR; INTRAVENOUS at 21:59

## 2022-07-25 RX ADMIN — SODIUM CHLORIDE 1000 ML: 9 INJECTION, SOLUTION INTRAVENOUS at 19:43

## 2022-07-25 RX ADMIN — MORPHINE SULFATE 4 MG: 4 INJECTION INTRAVENOUS at 21:59

## 2022-07-25 NOTE — ED NOTES
7:28 PM  Unintentional 45lb weight loss in 1 year, here c/o fatigue, malaise, muscle cramps and orange urine and decreased uop x today. [facility] for creatinine of 3.0. I have evaluated the patient as the Provider in Triage. I have reviewed His vital signs and the triage nurse assessment. I have talked with the patient and any available family and advised that I am the provider in triage and have ordered the appropriate study to initiate their work up based on the clinical presentation during my assessment. I have advised that the patient will be accommodated in the Main ED as soon as possible. I have also requested to contact the triage nurse or myself immediately if the patient experiences any changes in their condition during this brief waiting period.   Terrell Currie PA-C

## 2022-07-25 NOTE — ED TRIAGE NOTES
Pt reports [facility] for general fatigue over last year, reports today was just tired of feeling bad. Has lost about 45 lbs over last year as well. States sent by [facility] for elevated kidney function. Pt reports lower abd pain    Denies chest pain.

## 2022-07-26 ENCOUNTER — APPOINTMENT (OUTPATIENT)
Dept: ULTRASOUND IMAGING | Age: 62
DRG: 682 | End: 2022-07-26
Attending: INTERNAL MEDICINE
Payer: COMMERCIAL

## 2022-07-26 ENCOUNTER — APPOINTMENT (OUTPATIENT)
Dept: CT IMAGING | Age: 62
DRG: 682 | End: 2022-07-26
Attending: STUDENT IN AN ORGANIZED HEALTH CARE EDUCATION/TRAINING PROGRAM
Payer: COMMERCIAL

## 2022-07-26 LAB
ANION GAP SERPL CALC-SCNC: 8 MMOL/L (ref 5–15)
BUN SERPL-MCNC: 37 MG/DL (ref 6–20)
BUN/CREAT SERPL: 9 (ref 12–20)
CALCIUM SERPL-MCNC: 8.6 MG/DL (ref 8.5–10.1)
CHLORIDE SERPL-SCNC: 104 MMOL/L (ref 97–108)
CO2 SERPL-SCNC: 24 MMOL/L (ref 21–32)
CREAT SERPL-MCNC: 4.29 MG/DL (ref 0.7–1.3)
CREAT UR-MCNC: 53 MG/DL
CREAT UR-MCNC: 56 MG/DL
EST. AVERAGE GLUCOSE BLD GHB EST-MCNC: 103 MG/DL
GLUCOSE SERPL-MCNC: 120 MG/DL (ref 65–100)
HBA1C MFR BLD: 5.2 % (ref 4–5.6)
POTASSIUM SERPL-SCNC: 3.7 MMOL/L (ref 3.5–5.1)
PROT UR-MCNC: 135 MG/DL (ref 0–11.9)
PROT/CREAT UR-RTO: 2.5
SODIUM SERPL-SCNC: 136 MMOL/L (ref 136–145)
SODIUM UR-SCNC: 43 MMOL/L
UR CULT HOLD, URHOLD: NORMAL

## 2022-07-26 PROCEDURE — 82570 ASSAY OF URINE CREATININE: CPT

## 2022-07-26 PROCEDURE — 74011250636 HC RX REV CODE- 250/636: Performed by: STUDENT IN AN ORGANIZED HEALTH CARE EDUCATION/TRAINING PROGRAM

## 2022-07-26 PROCEDURE — 82784 ASSAY IGA/IGD/IGG/IGM EACH: CPT

## 2022-07-26 PROCEDURE — 84156 ASSAY OF PROTEIN URINE: CPT

## 2022-07-26 PROCEDURE — 83521 IG LIGHT CHAINS FREE EACH: CPT

## 2022-07-26 PROCEDURE — 87340 HEPATITIS B SURFACE AG IA: CPT

## 2022-07-26 PROCEDURE — 76770 US EXAM ABDO BACK WALL COMP: CPT

## 2022-07-26 PROCEDURE — 83516 IMMUNOASSAY NONANTIBODY: CPT

## 2022-07-26 PROCEDURE — 97165 OT EVAL LOW COMPLEX 30 MIN: CPT

## 2022-07-26 PROCEDURE — 71250 CT THORAX DX C-: CPT

## 2022-07-26 PROCEDURE — 83036 HEMOGLOBIN GLYCOSYLATED A1C: CPT

## 2022-07-26 PROCEDURE — 65270000029 HC RM PRIVATE

## 2022-07-26 PROCEDURE — 84300 ASSAY OF URINE SODIUM: CPT

## 2022-07-26 PROCEDURE — 86803 HEPATITIS C AB TEST: CPT

## 2022-07-26 PROCEDURE — 86037 ANCA TITER EACH ANTIBODY: CPT

## 2022-07-26 PROCEDURE — 83970 ASSAY OF PARATHORMONE: CPT

## 2022-07-26 PROCEDURE — 86038 ANTINUCLEAR ANTIBODIES: CPT

## 2022-07-26 PROCEDURE — 74011000250 HC RX REV CODE- 250: Performed by: STUDENT IN AN ORGANIZED HEALTH CARE EDUCATION/TRAINING PROGRAM

## 2022-07-26 PROCEDURE — 80048 BASIC METABOLIC PNL TOTAL CA: CPT

## 2022-07-26 PROCEDURE — 36415 COLL VENOUS BLD VENIPUNCTURE: CPT

## 2022-07-26 PROCEDURE — 86160 COMPLEMENT ANTIGEN: CPT

## 2022-07-26 PROCEDURE — 74011250637 HC RX REV CODE- 250/637: Performed by: STUDENT IN AN ORGANIZED HEALTH CARE EDUCATION/TRAINING PROGRAM

## 2022-07-26 RX ORDER — IPRATROPIUM BROMIDE AND ALBUTEROL SULFATE 2.5; .5 MG/3ML; MG/3ML
3 SOLUTION RESPIRATORY (INHALATION)
Status: DISCONTINUED | OUTPATIENT
Start: 2022-07-26 | End: 2022-07-29 | Stop reason: HOSPADM

## 2022-07-26 RX ORDER — SODIUM CHLORIDE 9 MG/ML
50 INJECTION, SOLUTION INTRAVENOUS CONTINUOUS
Status: DISCONTINUED | OUTPATIENT
Start: 2022-07-26 | End: 2022-07-29 | Stop reason: HOSPADM

## 2022-07-26 RX ORDER — ACETAMINOPHEN 650 MG/1
650 SUPPOSITORY RECTAL
Status: DISCONTINUED | OUTPATIENT
Start: 2022-07-26 | End: 2022-07-29 | Stop reason: HOSPADM

## 2022-07-26 RX ORDER — HYDROCHLOROTHIAZIDE 12.5 MG/1
12.5 CAPSULE ORAL DAILY
COMMUNITY
End: 2022-07-29

## 2022-07-26 RX ORDER — SODIUM CHLORIDE 0.9 % (FLUSH) 0.9 %
5-40 SYRINGE (ML) INJECTION AS NEEDED
Status: DISCONTINUED | OUTPATIENT
Start: 2022-07-26 | End: 2022-07-29 | Stop reason: HOSPADM

## 2022-07-26 RX ORDER — HYDROCODONE BITARTRATE AND ACETAMINOPHEN 5; 325 MG/1; MG/1
1 TABLET ORAL
Status: DISCONTINUED | OUTPATIENT
Start: 2022-07-26 | End: 2022-07-29 | Stop reason: HOSPADM

## 2022-07-26 RX ORDER — ACETAMINOPHEN 325 MG/1
650 TABLET ORAL
Status: DISCONTINUED | OUTPATIENT
Start: 2022-07-26 | End: 2022-07-29 | Stop reason: HOSPADM

## 2022-07-26 RX ORDER — CITALOPRAM 20 MG/1
20 TABLET, FILM COATED ORAL DAILY
Status: DISCONTINUED | OUTPATIENT
Start: 2022-07-26 | End: 2022-07-29 | Stop reason: HOSPADM

## 2022-07-26 RX ORDER — AMLODIPINE BESYLATE 5 MG/1
10 TABLET ORAL
Status: DISCONTINUED | OUTPATIENT
Start: 2022-07-26 | End: 2022-07-29 | Stop reason: HOSPADM

## 2022-07-26 RX ORDER — SODIUM CHLORIDE 0.9 % (FLUSH) 0.9 %
5-40 SYRINGE (ML) INJECTION EVERY 8 HOURS
Status: DISCONTINUED | OUTPATIENT
Start: 2022-07-26 | End: 2022-07-29 | Stop reason: HOSPADM

## 2022-07-26 RX ORDER — ONDANSETRON 2 MG/ML
4 INJECTION INTRAMUSCULAR; INTRAVENOUS
Status: DISCONTINUED | OUTPATIENT
Start: 2022-07-26 | End: 2022-07-29 | Stop reason: HOSPADM

## 2022-07-26 RX ORDER — ONDANSETRON 4 MG/1
4 TABLET, ORALLY DISINTEGRATING ORAL
Status: DISCONTINUED | OUTPATIENT
Start: 2022-07-26 | End: 2022-07-29 | Stop reason: HOSPADM

## 2022-07-26 RX ORDER — POLYETHYLENE GLYCOL 3350 17 G/17G
17 POWDER, FOR SOLUTION ORAL DAILY PRN
Status: DISCONTINUED | OUTPATIENT
Start: 2022-07-26 | End: 2022-07-29 | Stop reason: HOSPADM

## 2022-07-26 RX ORDER — TRAZODONE HYDROCHLORIDE 50 MG/1
50 TABLET ORAL
Status: DISCONTINUED | OUTPATIENT
Start: 2022-07-26 | End: 2022-07-29 | Stop reason: HOSPADM

## 2022-07-26 RX ADMIN — SODIUM CHLORIDE 100 ML/HR: 9 INJECTION, SOLUTION INTRAVENOUS at 04:14

## 2022-07-26 RX ADMIN — TRAZODONE HYDROCHLORIDE 50 MG: 50 TABLET ORAL at 21:21

## 2022-07-26 RX ADMIN — Medication 5 ML: at 00:27

## 2022-07-26 RX ADMIN — TRAZODONE HYDROCHLORIDE 50 MG: 50 TABLET ORAL at 04:04

## 2022-07-26 RX ADMIN — Medication 10 ML: at 07:47

## 2022-07-26 RX ADMIN — CITALOPRAM HYDROBROMIDE 20 MG: 20 TABLET ORAL at 09:03

## 2022-07-26 RX ADMIN — Medication 10 ML: at 21:21

## 2022-07-26 RX ADMIN — CEFTRIAXONE 1 G: 1 INJECTION, POWDER, FOR SOLUTION INTRAMUSCULAR; INTRAVENOUS at 04:04

## 2022-07-26 RX ADMIN — AMLODIPINE BESYLATE 10 MG: 5 TABLET ORAL at 16:51

## 2022-07-26 NOTE — ED NOTES
Patient states he is feeling better, tolerated breakfast, comfortable in bed, pain level is 6/10, however patient states its comfortable, no intervention needed at this time

## 2022-07-26 NOTE — CONSULTS
[address]     Name:  Laureen Arellano  MR#:  [id number]  :  1960  ACCOUNT #:  [de-identified]  DATE OF SERVICE:  2022    RENAL CONSULT    REASON FOR CONSULTATION:  Elevated serum creatinine. HISTORY OF PRESENT ILLNESS:  This is a 75-year-old white male with the following medical problems:  1. Tobacco use. 2.  Weight loss (45 pounds/1 year). 3.  History of EtOH. 4.  COPD. 5.  Hypertension. CURRENT INPATIENT MEDICATIONS:  Of note include:  1. Zofran. 2.  MiraLax. 3.  Amlodipine. 4.  Citalopram.  5.  Normal saline solution at 100 mL an hour. 6.  Several p.r.n. medications. I was asked to see the patient regarding elevated serum creatinine 4.29 mg/dL. For reference, last creatinine available on the [address] [place] [address] dates , 1.10 mg/dL. This is a 75-year-old white male who has been seen previously by my partner (Dr. Lindsay Florian), several years ago. He has not been followed recently. He presented to the hospital and found to have an elevated serum creatinine. He has also found to be rather proteinuric with the urine protein/creatinine ratio of 2.5. Imaging of the abdomen (CT scan) showed two hemorrhagic cyst as well as a questionable left renal mass versus another hemorrhagic cyst.    He is currently getting intravenous fluids. He denies any history of hematuria, proteinuria, nephrolithiasis, or pyelonephritis. He has had some difficulty initiating a stream of urine. He tells me this is a bit improved today. He had one day of diarrhea prior to presentation. He has no family history of renal disease. He is a current smoker. He denies any previous history of transfusion, hepatitis, jaundice. He denies any history of significant nonsteroidal anti-inflammatory ingestion, no use of herbal supplements. He indicates that he stopped alcohol ingestion about a year ago. He does complain of some fatigue currently.   He has had no unusual rash or joint pain. He also has indicated some suprapubic pain, but tells me this is better today. He has had no hemoptysis or hematemesis. FAMILY HISTORY:  Negative as above. SOCIAL HISTORY:  He is a current smoker, former history of EtOH. He has had some illicit drug use as a youth, denies any intravenous drug abuse, however. REVIEW OF SYSTEMS:  Negative except as noted above. PHYSICAL EXAMINATION  GENERAL:  Rather thin-appearing elderly male in bed. VITAL SIGNS:  Most recent blood pressure documented 157/84, pulse 69, oxygen saturation 97%. HEENT:  Head is normocephalic. Eyes show no scleral icterus. NECK:  Appears supple. LUNGS:  Clear to auscultation. CARDIOVASCULAR:  Shows a rhythm which is regular. ABDOMEN:  Nondistended, no palpable masses. EXTREMITIES:  Show no thigh edema. SKIN:  Warm to touch. NEUROLOGIC:  He is awake, alert, answers questions appropriately. LABORATORY DATA:  Of note, a urinalysis was performed showing a pH of 7, specific gravity 1.013, positive for protein, positive for glucose, positive for blood, 0 to 4 white blood cells, 5 to 10 red blood cells and few bacteria. His chemistries reveal a sodium of 136, potassium 3.7 chloride and bicarb 104 and 24 with a BUN and creatinine of 37 and 4.29, on 07/25 his creatinine was 3.69. Calcium is 8.6. His AST and ALT are 29 and 24 respectively. His urine protein/creatinine ratio is 2.5. His INR is 1.0 (2017!). Imaging as noted above. ASSESSMENT AND PLAN:  The [place] is the limit. 1.  He has hematuria, proteinuria, glycosuria (unusual) as well as an elevated serum creatinine. He has had a 45-pound weight loss. 2.  He does not have an usual rash or joint pain. 3.  The possibility there is glomerulonephritis would be rather significant. This may be primary (ANCA) or perhaps secondary to an underlying infection (hepatitis C). 4.  Full serologies. 5.  Bladder scan.   6.  Postvoid residual.    Dr. Shawn Davila will follow up tomorrow and can obtain his old records for review at that time.         MD ANAMARIA [id number]  D:  07/26/2022 14:18  T:  07/26/2022 19:10  JOB #:  [id number]

## 2022-07-26 NOTE — ADT AUTH CERT NOTES
Patient Demographics    Patient Name   José Owens Legal Sex   Male    1960 Address   [address] [place] [address]   [address] [facility] [address] Phone   [phone] [facility])   [phone] (Mobile) *[facility] Account    Name Acct ID Class Status Primary Coverage   José Owens [id number] INPATIENT Open BLUE CROSS - [address] [facility]            Guarantor Account (for Hospital Account [de-identified])    Name Relation to Pt Service Area Active? Acct Type   José Owens Self [facility] Yes Personal/Family   Address Phone     [address] [facility] [place]   [address] [phone])              Coverage Information (for Hospital Account [de-identified])    F/O Payor/Plan Subscriber  Subscriber Sex Precert #   BLUE CROSS/[place] BLUE CROSS OUT OF STATE [address]    Subscriber Subscriber #   José Owens [id number]   [facility] # Group Name   [id number] [address] [facility]   Address Phone   [address]    Policy Number Status Effective Date Benefits Phone   [id number] -  -   Auth/Cert               Diagnosis     Codes Comments   Acute renal failure, unspecified acute renal failure type [facility])  ICD-10-CM: N17.9   ICD-9-CM: 584.9             Admission Information    Arrival Date/Time: 2022 1804 Admit Date/Time: 2022 1819 IP Adm.  Date/Time: 2022 2353   Admission Type: Emergency Point of Origin: Non-health Care Facility/self Admit Category:    Means of Arrival: Car Primary Service: Medicine Secondary Service: N/A   Transfer Source:  Service Area: [facility] Unit: [facility] 5M1 MED SURG 1   Admit Provider: Elly Hoffman MD Attending Provider: María Morales MD Referring Provider:      Admission Information    Attending Provider Admission Dx Admitted on   Breanne Ace MD Acute renal failure (ARF) [facility]) 22   Service Isolation Code Status   Medicine -- Full Code   Allergies Advance Care Planning    Adhesive Tape-silicones Jump to the Activity         Admission Information    Unit/Bed: [place] [address] MED SURG  Service: Medicine   Admitting provider: Bronson Katz MD Phone: [phone]   Attending provider: Kwan Nance MD Phone: [phone]   PCP: Sun Cabrera MD Phone: [phone]   Admission dx:  Patient class: I   Admission type: ER         Patient Demographics    Patient Name   Radha Reagan   [id number] Legal Sex   Male    1960 Address   [address] [place] [address] Phone   [phone] (Home)   [phone] (Mobile) *Preferred*       H&P Notes       H&P by Brnoson Katz MD at 22 7375 documented on ED to Hosp-Admission (Current) from 2022 in [facility] [address] MED SURG 1    Author: Bronson Katz MD Author Type: Physician Filed: 22   Note Status: Addendum Cosign: Cosign Not Required Date of Service: 22 9501   : rBonson Katz MD (Physician)       Prior Versions: 1. Bronson Katz MD (Physician) at 22 004 - Signed   Expand All Collapse All                                       History & Physical     Primary Care Provider: Sun Cabrera MD  Source of Information: Patient and chart review         History of Presenting Illness:   Clovis Olson is a 57 y.o. male with hx of copd, htn, mdd and insomnia who presents to ed from urgent care; referred for elevated cr. Reports generalized malaise, fatigue and poor po intake for last 3 months. Noted dark urine with decreased output over the last week. Also endorse about 45 llbs weight loss. The patient denies any fever, chills, chest or abdominal pain, nausea, vomiting, cough, congestion, recent illness, palpitations, or dysuria. Remarkable vitals on ER Presentations: bp 146/80  Labs Remarkable for: cr 3.69, ck 431, ua small blood, 1+ bact  ER Images: ct jaye et pelvis: Indeterminate left renal mass (22 x 25 mm). ER Rx: 1L NS Bolus, morphine and zofran.        Review of Systems:  Pertinent items are noted in the History of Present Illness. Past Medical History:   Diagnosis Date    Chronic obstructive pulmonary disease ([facility]       per xray report 3/24/16. PCP Dr. Lina Bolton    Chronic pain       tamera feet, tamera knees    Hypertension              Past Surgical History:   Procedure Laterality Date    HX ORTHOPAEDIC         pins in left hip due to growth delay at age 12    HX [de-identified]        HX TONSILLECTOMY        HX UROLOGICAL         testicular surgery to \"clean a gland out\", 30 years ago    WRIST ARTHROSCOP,CLEAN/DRAIN         traumatic wrist injury, tendons were repaired              Prior to Admission medications   Medication Sig Start Date End Date Taking? Authorizing Provider   amLODIPine (NORVASC) 10 mg tablet Take 1 Tab by mouth daily (with dinner). Takes at 1600 daily. 7/30/17     Jazmyne Talley PA   acetaminophen-codeine (TYLENOL-CODEINE #3) 300-30 mg per tablet Take 1 Tab by mouth every four (4) hours as needed for Pain. Provider, Historical   albuterol (PROVENTIL HFA, VENTOLIN HFA, PROAIR HFA) 90 mcg/actuation inhaler Take 2 Puffs by inhalation every six (6) hours as needed for Wheezing. Provider, Historical            Allergies   Allergen Reactions    Adhesive Tape-Silicones Rash       Please use cloth tape         No family history on file. SOCIAL HISTORY:  Patient resides:  Independently x   Assisted Living     SNF     With family care         Smoking history:  None x   Former     Chronic        Alcohol history:  None x   Social     Chronic        Ambulates:  Independently x   w/cane     w/walker     w/wc     CODE STATUS:  DNR     Full x   Other        Objective:      Physical Exam:      Visit Vitals  BP (!) 146/80   Pulse 79   Temp 99.3 °F (37.4 °C)   Resp 18   Ht 5' 10\" (1.778 m)   Wt 58.1 kg (128 lb)   SpO2 98%   BMI 18.37 kg/m²            General: Alert, cooperative, no distress, appears stated age.    Head: Normocephalic, without obvious abnormality, atraumatic. Eyes: Conjunctivae/corneas clear. PERRL, EOMs intact. Nose: Nares normal. Septum midline. Mucosa normal.         Neck: Supple, symmetrical, trachea midline         Lungs:   Clear to auscultation bilaterally. Chest wall: No tenderness or deformity. Heart: Regular rate and rhythm, S1, S2 normal   Abdomen:   Soft, non-tender. Bowel sounds normal. No masses,  No organomegaly. Extremities: Extremities normal, atraumatic, no cyanosis or edema. Pulses: 2+ and symmetric all extremities. Skin: Skin color, texture, turgor normal. No rashes or lesions   Neurologic: CNII-XII intact. Data Review:     Recent Days:      Recent Labs     07/25/22 1821   WBC 8.2   HGB 14.4   HCT 42.9             Recent Labs     07/25/22 1821   *   K 3.6      CO2 28   *   BUN 29*   CREA 3.69*   CA 9.1   ALB 3.8   ALT 24      No results for input(s): PH, PCO2, PO2, HCO3, FIO2 in the last 72 hours. 24 Hour Results:  Recent Results          Recent Results (from the past 24 hour(s))   SAMPLES BEING HELD     Collection Time: 07/25/22  6:21 PM   Result Value Ref Range     SAMPLES BEING HELD 1DK GRN,1RED,1SST,1BLUE       COMMENT           Add-on orders for these samples will be processed based on acceptable specimen integrity and analyte stability, which may vary by analyte.    CBC WITH AUTOMATED DIFF     Collection Time: 07/25/22  6:21 PM   Result Value Ref Range     WBC 8.2 4.1 - 11.1 K/uL     RBC 4.88 4.10 - 5.70 M/uL     HGB 14.4 12.1 - 17.0 g/dL     HCT 42.9 36.6 - 50.3 %     MCV 87.9 80.0 - 99.0 FL     MCH 29.5 26.0 - 34.0 PG     MCHC 33.6 30.0 - 36.5 g/dL     RDW 14.6 (H) 11.5 - 14.5 %     PLATELET 813 700 - 673 K/uL     MPV 8.9 8.9 - 12.9 FL     NRBC 0.0 0  WBC     ABSOLUTE NRBC 0.00 0.00 - 0.01 K/uL     NEUTROPHILS 77 (H) 32 - 75 %     LYMPHOCYTES 11 (L) 12 - 49 %     MONOCYTES 10 5 - 13 %     EOSINOPHILS 1 0 - 7 %     BASOPHILS 1 0 - 1 %     IMMATURE GRANULOCYTES 0 0.0 - 0.5 % ABS. NEUTROPHILS 6.4 1.8 - 8.0 K/UL     ABS. LYMPHOCYTES 0.9 0.8 - 3.5 K/UL     ABS. MONOCYTES 0.8 0.0 - 1.0 K/UL     ABS. EOSINOPHILS 0.1 0.0 - 0.4 K/UL     ABS. BASOPHILS 0.1 0.0 - 0.1 K/UL     ABS. IMM. GRANS. 0.0 0.00 - 0.04 K/UL     DF AUTOMATED     METABOLIC PANEL, COMPREHENSIVE     Collection Time: 07/25/22  6:21 PM   Result Value Ref Range     Sodium 135 (L) 136 - 145 mmol/L     Potassium 3.6 3.5 - 5.1 mmol/L     Chloride 101 97 - 108 mmol/L     CO2 28 21 - 32 mmol/L     Anion gap 6 5 - 15 mmol/L     Glucose 136 (H) 65 - 100 mg/dL     BUN 29 (H) 6 - 20 MG/DL     Creatinine 3.69 (H) 0.70 - 1.30 MG/DL     BUN/Creatinine ratio 8 (L) 12 - 20       GFR est AA 20 (L) >60 ml/min/1.73m2     GFR est non-AA 17 (L) >60 ml/min/1.73m2     Calcium 9.1 8.5 - 10.1 MG/DL     Bilirubin, total 0.3 0.2 - 1.0 MG/DL     ALT (SGPT) 24 12 - 78 U/L     AST (SGOT) 29 15 - 37 U/L     Alk. phosphatase 47 45 - 117 U/L     Protein, total 7.1 6.4 - 8.2 g/dL     Albumin 3.8 3.5 - 5.0 g/dL     Globulin 3.3 2.0 - 4.0 g/dL     A-G Ratio 1.2 1.1 - 2.2     CK     Collection Time: 07/25/22  6:21 PM   Result Value Ref Range      (H) 39 - 308 U/L   URINALYSIS W/MICROSCOPIC     Collection Time: 07/25/22  6:27 PM   Result Value Ref Range     Color YELLOW/STRAW       Appearance CLOUDY (A) CLEAR       Specific gravity 1.013 1.003 - 1.030       pH (UA) 7.0 5.0 - 8.0       Protein >300 (A) NEG mg/dL     Glucose 100 (A) NEG mg/dL     Ketone Negative NEG mg/dL     Bilirubin Negative NEG       Blood SMALL (A) NEG       Urobilinogen 0.2 0.2 - 1.0 EU/dL     Nitrites Negative NEG       Leukocyte Esterase Negative NEG       WBC 0-4 0 - 4 /hpf     RBC 5-10 0 - 5 /hpf     Epithelial cells FEW FEW /lpf     Bacteria 1+ (A) NEG /hpf   URINE CULTURE HOLD SAMPLE     Collection Time: 07/25/22  6:27 PM     Specimen: Serum   Result Value Ref Range     Urine culture hold           Urine on hold in Microbiology dept for 2 days.   If unpreserved urine is submitted, it cannot be used for addtional testing after 24 hours, recollection will be required. Imaging:      Assessment:      Austen Rogel is a 57 y.o. male with hx of copd, htn, mdd and insomnia who is admitted for arf. Plan:         ARF  -Likely prerenal etiology  -No hydronephrosis on ct  -Continue volume resuscitation  -Serial BMPs     COPD  -Stable. PRN duonebd.      HTN  -Home amlodipine     Insomnia  -Trazodone qhs     Left Renal Mass  -No ct evidence of abdominal or pelvic LAD  -Plan for mri left kidney prior to d/v     FTT / Weight Loss  -Pan CT  -PT/OT consults     Bacteruria  -Rocephin 1g daily  -Follow urine culture           FEN/GI -  [email or web address]  Activity - as tolerated  DVT prophylaxis - SCDs  GI prophylaxis -  NI  Disposition - TBD     CODE STATUS:  Full code         Signed By: Alejandro Woody MD      2022                        Patient Demographics    Patient Name   Reginaldo West   [id number] Legal Sex   Male    1960 Address   [address] [place] [address] Phone   [phone] (Home)   [phone] (Mobile) *Preferred*     CSN:   [id number]     Admit Date: Admit Time Room Bed   2022  6:19  [[id number]] 01 [[id number]]       Attending Providers    Provider Pager From To   Zoey Brooke MD  22   Aron Shah MD  22   Ron Rome MD  22   Aron Shah MD  22   Ron Rome MD  22        Patient Contacts    Name Relation Home Work Mobile   [place] Spouse [phone]       Utilization Reviews         Renal Failure, Acute - Care Day 2 (2022) by Marie Josue RN       Review Entered Review Status   2022 14:31 Completed      Criteria Review      Care Day: 2 Care Date: 2022 Level of Care: Inpatient Floor    Guideline Day 2    Level Of Care    (X) Floor    2022 14:31:26 EDT by Rachel Alvarado floor    Clinical Status    (X) * Electrolyte abnormalities absent or improved    7/26/2022 14:31:26 EDT by Diandra Vo      7/26/22 04:15  Sodium: 136  Potassium: 3.7  Chloride: 104  CO2: 24  Anion gap: 8  Glucose: 120 (H)  BUN: 37 (H)  Creatinine: 4.29 (H)      (H): Data is abnormally high    ( ) * Acid-base abnormalities absent or improved    (X) * Hypotension absent    7/26/2022 14:31:26 EDT by Diandra Vo      157/84,    Activity    (X) Activity as tolerated    Routes    (X) Parenteral or oral hydration    7/26/2022 14:31:26 EDT by Marylene Clas      NS IVF    (X) Parenteral or oral medications    7/26/2022 14:31:26 EDT by Marylene Clas      Taking orals    (X) Oral diet as tolerated    7/26/2022 14:31:26 EDT by Marylene Clas      adult diet regular    Interventions    (X) Monitor electrolytes, renal function tests, acid-base, and volume status    7/26/2022 14:31:26 EDT by Diandra Vo      7/26/22 04:15  Sodium: 136  Potassium: 3.7  Chloride: 104  CO2: 24  Anion gap: 8  Glucose: 120 (H)  BUN: 37 (H)  Creatinine: 4.29 (H)  BUN/Creatinine ratio: 9 (L)  Calcium: 8.6    Medications    (X) Possible medical therapies    7/26/2022 14:31:26 EDT by Marylene Clas      NS IVF    * Milestone   Additional Notes   Assessment / Plan:       62M hx COPD, HTN p/w unintentional weight loss, weakness, found to have JANIS w/ proteinuria. #JANIS: Clinical hx might suggest pre-renal etiology, but minimal corresponding azotemia and has not responded to fluid thus far. ATN a consideration, but no casts. Does have blood and significant proteinuria. With weight loss, query malignancy vs auto-immune. Not on nephrotoxic agents as outpt.  Mild glucosuria so potentially DM related               - Urine lytes               - UPC               - Continue MIVF for now               - Renal to see, appreciate recs                           - MRI vs renal ultrasound               - Renally dose meds, avoid nephrotoxins #Renal Mass: CT abd/pelv with 22 x 25 mm indeterminate mass. Weight loss and hematuria a concern               - Imaging as above       #FTT/Weight loss: BMI is 18.4. Has lost 45 lb unintentionally. Glucosuria so consider DM, though malig vs autoimmune higher on ddx.  Doubt HIV/HIVAN               - PT, OT, nutrition               - A1c       #HTN:               - Amlodipine for now       #COPD: w/o exacerbation       #Depression: Citalopram                                                                                                                           Care Plan discussed with: Patient, Care Manager, and Nursing Staff       Discussed:  Care Plan       Prophylaxis:  Hep SQ       Disposition:  Home w/Family                                                                                          ___________________________________________________      Visit Vitals   /72   Pulse 73   Temp 99.3 °F (37.4 °C)   Resp 14   Ht 5' 10\" (1.778 m)   Wt 58.1 kg (128 lb)   SpO2 94%   BMI 18.37 kg/m²       SpO2 Readings from Last 6 Encounters:   07/26/22 94%            Exam:       Physical Exam:       Gen:  Well-developed, well-nourished, in no acute distress, frail   HEENT:  Pink conjunctivae, PERRL, hearing intact to voice, moist mucous membranes   Neck:  Supple, without masses, thyroid non-tender   Resp:  No accessory muscle use, clear breath sounds without wheezes rales or rhonchi   Card:  No murmurs, normal S1, S2 without thrills, bruits or peripheral edema   Abd:  Soft, non-tender, non-distended, normoactive bowel sounds are present   Musc:  No cyanosis or clubbing   Skin:  No rashes or ulcers, skin turgor is good   Neuro:  Cranial nerves 3-12 are grossly intact,  strength is 5/5 bilaterally and dorsi / plantarflexion is 5/5 bilaterally, follows commands appropriately   Psych:  Good insight, oriented to person, place and time, alert       Nephrology consult-Saw dr Vanessa Tan few years ago       Wt loss ??renal mass   Hemorrhagoc renal cyst x 1-2   Madhuri creat   Diarrhea x1   Tobacco   ?? Fanconi           - seros incl gbm   - Us   - pvr       If mass on US needs uro to see      Medications   0.9% sodium chloride infusion   Rate: 100 mL/hr Dose: 100 mL/hr   Freq: CONTINUOUS Route: IV      citalopram (CELEXA) tablet 20 mg   Dose: 20 mg   Freq: DAILY Route: PO      traZODone (DESYREL) tablet 50 mg   Dose: 50 mg   Freq: EVERY BEDTIME Route: PO      cefTRIAXone (ROCEPHIN) 1 g in 0.9% sodium chloride 10 mL IV syringe   Dose: 1 g   Freq: EVERY 24 HOURS Route: IV        Renal Failure, Acute - Care Day 1 (7/25/2022) by Ricky Montanez RN       Review Entered Review Status   7/26/2022 11:19 Completed      Criteria Review      Care Day: 1 Care Date: 7/25/2022 Level of Care:    Guideline Day 1    Level Of Care    ( ) ICU or floor    7/26/2022 11:19:23 EDT by Lang Burkitt      ED    Clinical Status    (X) * Clinical Indications met    7/26/2022 11:19:23 EDT by Lang Burkitt      58 y/o admitted with ARF    Routes    (X) Parenteral or oral hydration    7/26/2022 11:19:23 EDT by Lang Burkitt NS IVF    (X) Parenteral or oral medications    7/26/2022 11:19:23 EDT by Lang Burkitt      Takng orals    (X) Oral diet as tolerated    7/26/2022 11:19:23 EDT by Lang Burkitt      adult diet regualr    Interventions    (X) Urinalysis and other urine tests, CBC, renal function tests, hepatitis B test, other laboratory tests    7/26/2022 11:19:23 EDT by Diandra Griffin      7/25/22 18:27  Color: YELLOW/STRAW  Appearance: CLOUDY ! Specific gravity: 1.013  pH (UA): 7.0  Protein: >300 ! Glucose: 100 ! Ketone: Negative  Blood: SMALL !   Bilirubin: Negative  Urobilinogen: 0.2  Nitrites: Negative  Leukocyte Esterase: Negative  Epithelia    Medications    (X) Possible medical therapies    7/26/2022 11:19:23 EDT by Lang Burkitt NS IVF    * Milestone   Additional Notes   Unintentional 45lb weight loss in 1 year, here c/o fatigue, malaise, muscle cramps and orange urine and decreased uop x today. Sent by patient first for creatinine of 3.0. History of Presenting Illness:   Kandice Delvalle is a 57 y.o. male with hx of copd, htn, mdd and insomnia who presents to ed from urgent care; referred for elevated cr. Reports generalized malaise, fatigue and poor po intake for last 3 months. Noted dark urine with decreased output over the last week. Also endorse about 45 llbs weight loss. The patient denies any fever, chills, chest or abdominal pain, nausea, vomiting, cough, congestion, recent illness, palpitations, or dysuria. Remarkable vitals on ER Presentations: bp 146/80   Labs Remarkable for: cr 3.69, ck 431, ua small blood, 1+ bact   ER Images: ct jaye et pelvis: Indeterminate left renal mass (22 x 25 mm). ER Rx: 1L NS Bolus, morphine and zofran. Visit Vitals   BP (!) 146/80   Pulse 79   Temp 99.3 °F (37.4 °C)   Resp 18   Ht 5' 10\" (1.778 m)   Wt 58.1 kg (128 lb)   SpO2 98%   BMI 18.37 kg/m²              General: Alert, cooperative, no distress, appears stated age. Head: Normocephalic, without obvious abnormality, atraumatic. Eyes: Conjunctivae/corneas clear. PERRL, EOMs intact. Nose: Nares normal. Septum midline. Mucosa normal.         Neck: Supple, symmetrical, trachea midline         Lungs:   Clear to auscultation bilaterally. Chest wall: No tenderness or deformity. Heart: Regular rate and rhythm, S1, S2 normal   Abdomen:   Soft, non-tender. Bowel sounds normal. No masses,  No organomegaly. Extremities: Extremities normal, atraumatic, no cyanosis or edema. Pulses: 2+ and symmetric all extremities. Skin: Skin color, texture, turgor normal. No rashes or lesions   Neurologic: CNII-XII intact.            Data Review:       Recent Days:   Recent Labs     07/25/22   1821   WBC 8.2   HGB 14.4   HCT 42.9          Recent Labs     07/25/22   1821   *   K 3.6      CO2 28   *   BUN 29*   CREA 3.69*   CA 9.1   ALB 3.8   ALT 24          7/25/22 18:21   WBC: 8.2   NRBC: 0.0   RBC: 4.88   HGB: 14.4   HCT: 42.9   MCV: 87.9   MCH: 29.5   MCHC: 33.6   RDW: 14.6 (H)   PLATELET: 787   MPV: 8.9   NEUTROPHILS: 77 (H)   LYMPHOCYTES: 11 (L)   MONOCYTES: 10   EOSINOPHILS: 1   BASOPHILS: 1   IMMATURE GRANULOCYTES: 0   DF: AUTOMATED   ABSOLUTE NRBC: 0.00   ABS. NEUTROPHILS: 6.4   ABS. IMM. GRANS.: 0.0   ABS. LYMPHOCYTES: 0.9   ABS. MONOCYTES: 0.8   ABS. EOSINOPHILS: 0.1   ABS. BASOPHILS: 0.1      7/25/22 18:21   Sodium: 135 (L)   Potassium: 3.6   Chloride: 101   CO2: 28   Anion gap: 6   Glucose: 136 (H)   BUN: 29 (H)   Creatinine: 3.69 (H)   BUN/Creatinine ratio: 8 (L)   Calcium: 9.1   GFR est non-AA: 17 (L)   GFR est AA: 20 (L)   Bilirubin, total: 0.3   Protein, total: 7.1   Albumin: 3.8   Globulin: 3.3   A-G Ratio: 1.2   ALT: 24   AST: 29   Alk. phosphatase: 47   CK: 431 (H)         Chest x-ray-IMPRESSION   1. No acute process. 2. Indeterminate left renal mass (22 x 25 mm). If patient's GFR returns to   normal or baseline greater than 30, renal protocol CT is recommended for further   evaluation. If not, unenhanced MRI may provide additional information. 3. Moderate emphysema.               Collection Time: 07/25/22  6:21 PM   Result Value Ref Range      (H) 39 - 308 U/L   URINALYSIS W/MICROSCOPIC     Collection Time: 07/25/22  6:27 PM   Result Value Ref Range     Color YELLOW/STRAW      Appearance CLOUDY (A) CLEAR       Specific gravity 1.013 1.003 - 1.030       pH (UA) 7.0 5.0 - 8.0       Protein >300 (A) NEG mg/dL     Glucose 100 (A) NEG mg/dL     Ketone Negative NEG mg/dL     Bilirubin Negative NEG       Blood SMALL (A) NEG       Urobilinogen 0.2 0.2 - 1.0 EU/dL     Nitrites Negative NEG       Leukocyte Esterase Negative NEG       WBC 0-4 0 - 4 /hpf     RBC 5-10 0 - 5 /hpf     Epithelial cells FEW FEW /lpf     Bacteria 1+ (A) NEG /hpf   URINE CULTURE HOLD SAMPLE     Collection Time: 07/25/22 6:27 PM     Specimen: Serum   Result Value Ref Range     Urine culture hold          Urine on hold in Microbiology dept for 2 days. If unpreserved urine is submitted, it cannot be used for addtional testing after 24 hours, recollection will be required. Imaging:        Assessment:       Vasyl Issa is a 57 y.o. male with hx of copd, htn, mdd and insomnia who is admitted for arf. Plan:           ARF   -Likely prerenal etiology   -No hydronephrosis on ct   -Continue volume resuscitation   -Serial BMPs       COPD   -Stable. PRN duonebd.        HTN   -Home amlodipine       Insomnia   -Trazodone qhs       Left Renal Mass   -No ct evidence of abdominal or pelvic LAD   -Plan for mri left kidney prior to d/v       FTT / Weight Loss   -Pan CT   -PT/OT consults       Bacteruria   -Rocephin 1g daily   -Follow urine culture               FEN/GI -  [email or web address]   Activity - as tolerated   DVT prophylaxis - SCDs   GI prophylaxis -  NI   Disposition - TBD       CODE STATUS:  Full code      ED Medications   morphine injection 4 mg   Dose: 4 mg   Freq: NOW Route: IV      ondansetron (ZOFRAN) injection 4 mg   Dose: 4 mg   Freq: NOW Route: IV      sodium chloride 0.9 % bolus infusion 1,000 mL   Dose: 1,000 mL   Freq: ONCE Route: IV        Renal Failure, Acute - Clinical Indications for Admission to Inpatient Care by Abbie Hough RN       Review Entered Review Status   7/26/2022 11:15 Completed      Criteria Review      Clinical Indications for Admission to Inpatient Care    Most Recent : Mamie Quick Most Recent Date: 7/26/2022 11:15:47 EDT    (X) Admission is indicated for  1 or more  of the following  [A] [B] (1) (2) (3) (4) (5) (6) (7)    (8) (9) (10):       (X) Acute [B] renal failure (stage 3 acute kidney injury), [A] as indicated by  1 or more  of       the following :          (X) 3-fold rise in serum creatinine from baseline          7/26/2022 11:15:47 EDT by Mamie Quick 7/25/22 18:21  BUN: 29 (H)  Creatinine: 3.69 (H)      (H): Data is abnormally high

## 2022-07-26 NOTE — H&P
History & Physical    Primary Care Provider: Tate Jones MD  Source of Information: Patient and chart review    History of Presenting Illness:   Manny Rolon is a 57 y.o. male with hx of copd, htn, mdd and insomnia who presents to ed from urgent care; referred for elevated cr. Reports generalized malaise, fatigue and poor po intake for last 3 months. Noted dark urine with decreased output over the last week. Also endorse about 45 llbs weight loss. The patient denies any fever, chills, chest or abdominal pain, nausea, vomiting, cough, congestion, recent illness, palpitations, or dysuria. Remarkable vitals on ER Presentations: bp 146/80  Labs Remarkable for: cr 3.69, ck 431, ua small blood, 1+ bact  ER Images: ct jaye et pelvis: Indeterminate left renal mass (22 x 25 mm). ER Rx: 1L NS Bolus, morphine and zofran. Review of Systems:  Pertinent items are noted in the History of Present Illness. Past Medical History:   Diagnosis Date    Chronic obstructive pulmonary disease ([facility] [address]     per xray report 3/24/16. PCP Dr. Jayme Parra    Chronic pain     tamera feet, tamera knees    Hypertension       Past Surgical History:   Procedure Laterality Date    HX ORTHOPAEDIC      pins in left hip due to growth delay at age 12    HX [de-identified]      HX TONSILLECTOMY      HX UROLOGICAL      testicular surgery to \"clean a gland out\", 30 years ago    WRIST ARTHROSCOP,CLEAN/DRAIN      traumatic wrist injury, tendons were repaired     Prior to Admission medications    Medication Sig Start Date End Date Taking? Authorizing Provider   amLODIPine (NORVASC) 10 mg tablet Take 1 Tab by mouth daily (with dinner). Takes at 1600 daily. 7/30/17   Page Talley PA   acetaminophen-codeine (TYLENOL-CODEINE #3) 300-30 mg per tablet Take 1 Tab by mouth every four (4) hours as needed for Pain.     Provider, Historical   albuterol (PROVENTIL HFA, VENTOLIN HFA, PROAIR HFA) 90 mcg/actuation inhaler Take 2 Puffs by inhalation every six (6) hours as needed for Wheezing. Provider, Historical     Allergies   Allergen Reactions    Adhesive Tape-Silicones Rash     Please use cloth tape        No family history on file. SOCIAL HISTORY:  Patient resides:  Independently x   Assisted Living    SNF    With family care       Smoking history:   None x   Former    Chronic      Alcohol history:   None x   Social    Chronic      Ambulates:   Independently x   w/cane    w/walker    w/wc    CODE STATUS:  DNR    Full x   Other      Objective:     Physical Exam:     Visit Vitals  BP (!) 146/80   Pulse 79   Temp 99.3 °F (37.4 °C)   Resp 18   Ht 5' 10\" (1.778 m)   Wt 58.1 kg (128 lb)   SpO2 98%   BMI 18.37 kg/m²           General:  Alert, cooperative, no distress, appears stated age. Head:  Normocephalic, without obvious abnormality, atraumatic. Eyes:  Conjunctivae/corneas clear. PERRL, EOMs intact. Nose: Nares normal. Septum midline. Mucosa normal.        Neck: Supple, symmetrical, trachea midline       Lungs:   Clear to auscultation bilaterally. Chest wall:  No tenderness or deformity. Heart:  Regular rate and rhythm, S1, S2 normal   Abdomen:   Soft, non-tender. Bowel sounds normal. No masses,  No organomegaly. Extremities: Extremities normal, atraumatic, no cyanosis or edema. Pulses: 2+ and symmetric all extremities. Skin: Skin color, texture, turgor normal. No rashes or lesions   Neurologic: CNII-XII intact. Data Review:     Recent Days:  Recent Labs     07/25/22  1821   WBC 8.2   HGB 14.4   HCT 42.9        Recent Labs     07/25/22  1821   *   K 3.6      CO2 28   *   BUN 29*   CREA 3.69*   CA 9.1   ALB 3.8   ALT 24     No results for input(s): PH, PCO2, PO2, HCO3, FIO2 in the last 72 hours.     24 Hour Results:  Recent Results (from the past 24 hour(s))   SAMPLES BEING HELD    Collection Time: 07/25/22  6:21 PM   Result Value Ref Range    SAMPLES BEING HELD 1DK GRN,1RED,1SST,1BLUE COMMENT        Add-on orders for these samples will be processed based on acceptable specimen integrity and analyte stability, which may vary by analyte. CBC WITH AUTOMATED DIFF    Collection Time: 07/25/22  6:21 PM   Result Value Ref Range    WBC 8.2 4.1 - 11.1 K/uL    RBC 4.88 4.10 - 5.70 M/uL    HGB 14.4 12.1 - 17.0 g/dL    HCT 42.9 36.6 - 50.3 %    MCV 87.9 80.0 - 99.0 FL    MCH 29.5 26.0 - 34.0 PG    MCHC 33.6 30.0 - 36.5 g/dL    RDW 14.6 (H) 11.5 - 14.5 %    PLATELET 082 479 - 586 K/uL    MPV 8.9 8.9 - 12.9 FL    NRBC 0.0 0  WBC    ABSOLUTE NRBC 0.00 0.00 - 0.01 K/uL    NEUTROPHILS 77 (H) 32 - 75 %    LYMPHOCYTES 11 (L) 12 - 49 %    MONOCYTES 10 5 - 13 %    EOSINOPHILS 1 0 - 7 %    BASOPHILS 1 0 - 1 %    IMMATURE GRANULOCYTES 0 0.0 - 0.5 %    ABS. NEUTROPHILS 6.4 1.8 - 8.0 K/UL    ABS. LYMPHOCYTES 0.9 0.8 - 3.5 K/UL    ABS. MONOCYTES 0.8 0.0 - 1.0 K/UL    ABS. EOSINOPHILS 0.1 0.0 - 0.4 K/UL    ABS. BASOPHILS 0.1 0.0 - 0.1 K/UL    ABS. IMM. GRANS. 0.0 0.00 - 0.04 K/UL    DF AUTOMATED     METABOLIC PANEL, COMPREHENSIVE    Collection Time: 07/25/22  6:21 PM   Result Value Ref Range    Sodium 135 (L) 136 - 145 mmol/L    Potassium 3.6 3.5 - 5.1 mmol/L    Chloride 101 97 - 108 mmol/L    CO2 28 21 - 32 mmol/L    Anion gap 6 5 - 15 mmol/L    Glucose 136 (H) 65 - 100 mg/dL    BUN 29 (H) 6 - 20 MG/DL    Creatinine 3.69 (H) 0.70 - 1.30 MG/DL    BUN/Creatinine ratio 8 (L) 12 - 20      GFR est AA 20 (L) >60 ml/min/1.73m2    GFR est non-AA 17 (L) >60 ml/min/1.73m2    Calcium 9.1 8.5 - 10.1 MG/DL    Bilirubin, total 0.3 0.2 - 1.0 MG/DL    ALT (SGPT) 24 12 - 78 U/L    AST (SGOT) 29 15 - 37 U/L    Alk.  phosphatase 47 45 - 117 U/L    Protein, total 7.1 6.4 - 8.2 g/dL    Albumin 3.8 3.5 - 5.0 g/dL    Globulin 3.3 2.0 - 4.0 g/dL    A-G Ratio 1.2 1.1 - 2.2     CK    Collection Time: 07/25/22  6:21 PM   Result Value Ref Range     (H) 39 - 308 U/L   URINALYSIS W/MICROSCOPIC    Collection Time: 07/25/22  6:27 PM Result Value Ref Range    Color YELLOW/STRAW      Appearance CLOUDY (A) CLEAR      Specific gravity 1.013 1.003 - 1.030      pH (UA) 7.0 5.0 - 8.0      Protein >300 (A) NEG mg/dL    Glucose 100 (A) NEG mg/dL    Ketone Negative NEG mg/dL    Bilirubin Negative NEG      Blood SMALL (A) NEG      Urobilinogen 0.2 0.2 - 1.0 EU/dL    Nitrites Negative NEG      Leukocyte Esterase Negative NEG      WBC 0-4 0 - 4 /hpf    RBC 5-10 0 - 5 /hpf    Epithelial cells FEW FEW /lpf    Bacteria 1+ (A) NEG /hpf   URINE CULTURE HOLD SAMPLE    Collection Time: 07/25/22  6:27 PM    Specimen: Serum   Result Value Ref Range    Urine culture hold        Urine on hold in Microbiology dept for 2 days. If unpreserved urine is submitted, it cannot be used for addtional testing after 24 hours, recollection will be required. Imaging:     Assessment:     Tyson Zazueta is a 57 y.o. male with hx of copd, htn, mdd and insomnia who is admitted for arf. Plan:       ARF  -Likely prerenal etiology  -No hydronephrosis on ct  -Continue volume resuscitation  -Serial BMPs    COPD  -Stable. PRN duonebd.     HTN  -Home amlodipine    Insomnia  -Trazodone qhs    Left Renal Mass  -No ct evidence of abdominal or pelvic LAD  -Plan for mri left kidney prior to d/v    FTT / Weight Loss  -Pan CT  -PT/OT consults    Bacteruria  -Rocephin 1g daily  -Follow urine culture        FEN/GI -  [email or web address]  Activity - as tolerated  DVT prophylaxis - SCDs  GI prophylaxis -  NI  Disposition - TBD    CODE STATUS:  Full code       Signed By: Marc Jonas MD     July 25, 2022

## 2022-07-26 NOTE — PROGRESS NOTES
CARE MANAGEMENT INITIAL ASSESSEMENT      NAME:   Camilla Malhotra   :     1960   MRN:     [id number]       Emergency Contact:  Extended Emergency Contact Information  Primary Emergency Contact: [address] Phone: [phone]  Mobile Phone: [phone]  Relation: Spouse    Advance Directive:  Full Code, does not have an advance directive. [place] [facility] Decision Maker:   Tonia Chavez- spouse- [phone]    Reason for Admission:  Mr. Carolina Gandhi is a 57 y.o. male with history that includes depression, COPD, and HTN  who was emergently admitted for:  ARF    Patient Active Problem List   Diagnosis Code    Acute renal failure (ARF) ([facility] [address] N17.9       Assessment: In person with patient. RUR:  6%  Risk Level:  Low  Value-based purchasing:   No  Bundle patient:  No    Residency:  Private residence  Exterior Steps:   3  Interior Steps:  None    Lives With:  Spouse/Significant Other    Prior functioning:  Independent. Patient requires assistance with:  N/A    Prior DME required:  None    DME available:  None    Rehab history:  None    Discharge Concerns/Barriers Identified:  None identified      Insurer:  Payor: Anup Mandel / Plan: [address] [place] [address] / Product Type: PPO /     PCP: Dakota Us MD   [facility] of Practice:  [facility]   Current patient: Yes   Approximate date of last visit: about 5 months   Access to virtual PCP visits:  Unknown    Pharmacy:  [facility]   Financial/Difficulty affording medications:  None identified    COVID-19 vaccination status:  Fully vaccinated. DC Transport:  Family      Transition of care plan:  Home with outpatient follow-up     Comments:   Pt admitted on 22 for ARF. CM met with Pt to complete initial assessment. Pt lives at home with his spouse. Pt has no hx of HH, home O2, or equipment. Pt is independent with ADLs and ambulation. Pt denies problems with either. Pt is employed.  Pt is able to drive. Discharge plan is for Pt to return home. Family will transport Pt home.   _____________________________________  Hillary Palmer [address] [facility]  Available via [facility]  7/26/2022   11:01 AM      Care Management Interventions  PCP Verified by CM: Yes Hailey Flanagan MD)  Mode of Transport at Discharge:  Other (see comment) (family)  Transition of Care Consult (CM Consult): Discharge Planning  MyChart Signup: No  Discharge Durable Medical Equipment: No  Physical Therapy Consult: Yes  Occupational Therapy Consult: Yes  Speech Therapy Consult: No  Support Systems: Spouse/Significant Other, Other Family Member(s)  Confirm Follow Up Transport: Self  Discharge Location  Patient Expects to be Discharged to[de-identified] Home

## 2022-07-26 NOTE — PROGRESS NOTES
OCCUPATIONAL THERAPY EVALUATION/DISCHARGE  Patient: Jinny Roberts (42 y.o. male)  Date: 7/26/2022  Primary Diagnosis: Acute renal failure (ARF) ([facility]) [N17.9]       Precautions:       ASSESSMENT  Based on the objective data described below, the patient presents with near functional baseline for ADLs functional mobility and transfers. Patient presents in bed, having just completed lower body dressing independently per report and stating much of his pain from yesterday has resolved and that he has been able to get up to the bathroom on his own with no issues with this \"as long as someone unplugs me, I'm good. \" Patient demonstrating good strength, endurance, and insight into deficits. No further acute OT needs identified at this time; please re-consult if change in status or further needs arise. Recommending home with family assistance when medically ready. Current Level of Function (ADLs/self-care): supervision-Mod I for ADLs, transfers, and functional mobility    Functional Outcome Measure: The patient scored 100/100 on the Barthel Index outcome measure     Other factors to consider for discharge: None     PLAN :  Recommend with staff: OOB to recliner for meals, OOB with assist X1 to bathroom    Recommendation for discharge: (in order for the patient to meet his/her long term goals)  No skilled occupational therapy/ follow up rehabilitation needs identified at this time. This discharge recommendation:  Has been made in collaboration with the attending provider and/or case management    IF patient discharges home will need the following DME: patient owns DME required for discharge       SUBJECTIVE:   Patient stated I feel a lot better than yesterday.     OBJECTIVE DATA SUMMARY:   HISTORY:   Past Medical History:   Diagnosis Date    Chronic obstructive pulmonary disease ([facility] [address]     per xray report 3/24/16.  PCP Dr. Frieda Castelan Chronic pain     tamera feet, tamera knees    Hypertension      Past Surgical History: Procedure Laterality Date    HX ORTHOPAEDIC      pins in left hip due to growth delay at age 11   [de-identified] ORTHOPAEDIC      HX TONSILLECTOMY      HX UROLOGICAL      testicular surgery to \"clean a gland out\", 30 years ago    WRIST ARTHROSCOP,CLEAN/DRAIN      traumatic wrist injury, tendons were repaired       Prior Level of Function/Environment/Context:  Expanded or extensive additional review of patient history:   Home Situation  Home Environment: Private residence  # Steps to Enter: 3  Rails to Enter: Yes  Hand Rails : Bilateral  One/Two Story Residence: One story  Living Alone: No  Support Systems: Spouse/Significant Other, Child(kris), Other (Comment)  Patient Expects to be Discharged to[de-identified] Home  Current DME Used/Available at Home: Wheelchair, Dueorlando Stauffer, [address] chair, Commode, bedside, Tub transfer bench  Tub or Shower Type: Tub/Shower combination    Hand dominance: Right    EXAMINATION OF PERFORMANCE DEFICITS:  Cognitive/Behavioral Status:  Neurologic State: Alert  Orientation Level: Oriented X4  Cognition: Appropriate decision making; Appropriate for age attention/concentration; Appropriate safety awareness; Follows commands  Perception: Appears intact  Perseveration: No perseveration noted  Safety/Judgement: Awareness of environment;Home safety;Good awareness of safety precautions; Insight into deficits    Hearing: Auditory  Auditory Impairment: None    Vision/Perceptual:    Tracking: Able to track stimulus in all quadrants w/o difficulty                      Acuity: Within Defined Limits    Corrective Lenses: Reading glasses    Range of Motion:  AROM: Within functional limits  PROM: Within functional limits     Strength:  Strength: Within functional limits     Coordination:  Coordination: Within functional limits    Tone & Sensation:  Tone: Normal  Sensation: Intact (intact in BUE hands, impaired in BLE (feet))       Balance:  Sitting: Intact; Without support  Standing: Intact; Without support    Functional Mobility and Transfers for ADLs:  Bed Mobility:  Rolling: Independent  Supine to Sit: Independent  Sit to Supine: Independent  Scooting: Independent    Transfers:  Sit to Stand: Supervision  Stand to Sit: Supervision    ADL Assessment:  Feeding: Independent    Oral Facial Hygiene/Grooming: Independent    Bathing: Modified independent         Upper Body Dressing: Modified independent    Lower Body Dressing: Modified independent    Toileting: Modified independent                ADL Intervention and task modifications:                                Lower Body Dressing Assistance  Socks: Supervision  Shoes with Cloth Laces: Supervision         Cognitive Retraining  Safety/Judgement: Awareness of environment;Home safety;Good awareness of safety precautions; Insight into deficits    Functional Measure:    Barthel Index:  Bathin  Bladder: 10  Bowels: 10  Groomin  Dressing: 10  Feeding: 10  Mobility: 15  Stairs: 10  Toilet Use: 10  Transfer (Bed to Chair and Back): 15  Total: 100/100      The Barthel ADL Index: Guidelines  1. The index should be used as a record of what a patient does, not as a record of what a patient could do. 2. The main aim is to establish degree of independence from any help, physical or verbal, however minor and for whatever reason. 3. The need for supervision renders the patient not independent. 4. A patient's performance should be established using the best available evidence. Asking the patient, friends/relatives and nurses are the usual sources, but direct observation and common sense are also important. However direct testing is not needed. 5. Usually the patient's performance over the preceding 24-48 hours is important, but occasionally longer periods will be relevant. 6. Middle categories imply that the patient supplies over 50 per cent of the effort. 7. Use of aids to be independent is allowed.     Score Interpretation (from [address] [place] [address])    Independent   60-79 Minimally independent 40-59 Partially dependent   20-39 Very dependent   <20 Totally dependent     -Roberto Alexander, Barthel, D.W. (6792). Functional evaluation: the Barthel Index. [address] ([address] (). The Barthel activities of daily living index: self-reporting versus actual performance in the old (> or = 75 years). Journal of [address] [place] [address] 45(7), [facility], RYANN, Kingsley Woo., Toribio Naqvi. (1999). Measuring the change in disability after inpatient rehabilitation; comparison of the responsiveness of the Barthel Index and Functional [place] Measure. Journal of Neurology, Neurosurgery, and Psychiatry, 66(4), 875-686. PARVIZ Galo, DENICE Wheeler, & Benigno Fitzpatrick MMiltonA. (2004) Assessment of post-stroke quality of life in cost-effectiveness studies: The usefulness of the Barthel Index and the EuroQoL-5D. Quality of Life Research, 15, 520-43        Occupational Therapy Evaluation Charge Determination   History Examination Decision-Making   LOW Complexity : Brief history review  LOW Complexity : 1-3 performance deficits relating to physical, cognitive , or psychosocial skils that result in activity limitations and / or participation restrictions  LOW Complexity : No comorbidities that affect functional and no verbal or physical assistance needed to complete eval tasks       Based on the above components, the patient evaluation is determined to be of the following complexity level: LOW   Pain Ratin/10 in abdomen    Activity Tolerance:   Good, tolerates ADLs without rest breaks, and SpO2 stable on RA    After treatment patient left in no apparent distress:    Sitting on EOB getting ready to transfer to 5th floor with transport    COMMUNICATION/EDUCATION:   The patients plan of care was discussed with: Physical therapist and Registered nurse.      Thank you for this referral.  Hal Rocha OT  Time Calculation: 14 mins

## 2022-07-26 NOTE — ED NOTES
Verbal shift change report given to Maco Harris (oncoming nurse) by Sonali Arellano (offgoing nurse). Report included the following information SBAR, ED Summary, Procedure Summary and MAR.

## 2022-07-26 NOTE — PROGRESS NOTES
1650: Pt's /66. MD informed. Ordered to administer pt's Norvasc and monitor. 1930: Bedside and Verbal shift change report given to PEREZ RN (oncoming nurse) by [facility], RN (offgoing nurse). Report included the following information SBAR, Kardex, ED Summary, Intake/Output, MAR, Accordion, Recent Results, and Med Rec Status.

## 2022-07-26 NOTE — PROGRESS NOTES
Physical Therapy Note:    Orders acknowledged, chart reviewed, discussed with RN and evaluating OT. RN reports pt is up ad zeynep and pt denies mobility impairments. He denies presyncopal symptoms with activity and losses of balance; he reports his strength is sufficient for his desired activities at this time. He politely declines PT interventions and requests PT discontinue evaluation attempts. Orders completed per pt request. Please re-consult should mobility status change.      Jasson Green, PT, DPT, Bruce Elena

## 2022-07-26 NOTE — ED PROVIDER NOTES
She has a history of COPD and hypertension this to the emergency department with a 45 pound weight loss the past several months. Also reports being contacted with abnormal laboratory results. He is here to the emergency department today complaining of generalized malaise, fatigue, and failure to thrive. The history is provided by the patient. Abnormal Lab Results  This is a chronic problem. The current episode started more than 1 week ago. The problem occurs constantly. The problem has been rapidly worsening. Nothing aggravates the symptoms. Nothing relieves the symptoms. Past Medical History:   Diagnosis Date    Chronic obstructive pulmonary disease ([address]     per xray report 3/24/16. PCP Dr. Karin Cueto    Chronic pain     tamera feet, tamera knees    Hypertension        Past Surgical History:   Procedure Laterality Date    HX ORTHOPAEDIC      pins in left hip due to growth delay at age 12    HX [de-identified]      HX TONSILLECTOMY      HX UROLOGICAL      testicular surgery to \"clean a gland out\", 30 years ago    WRIST ARTHROSCOP,CLEAN/DRAIN      traumatic wrist injury, tendons were repaired         No family history on file. Social History     Socioeconomic History    Marital status:      Spouse name: Not on file    Number of children: Not on file    Years of education: Not on file    Highest education level: Not on file   Occupational History    Not on file   Tobacco Use    Smoking status: Every Day     Packs/day: 0.50     Types: Cigarettes    Smokeless tobacco: Never   Substance and Sexual Activity    Alcohol use:  Yes     Alcohol/week: 6.0 - 7.0 standard drinks     Types: 6 - 7 Cans of beer per week     Comment: 42-49 / week     Drug use: No     Comment: Past HX:  cocaine    Sexual activity: Yes     Partners: Female   Other Topics Concern    Not on file   Social History Narrative    Not on file     Social Determinants of Health     Financial Resource Strain: Not on file   Food Insecurity: Not on file Transportation Needs: Not on file   Physical Activity: Not on file   Stress: Not on file   Social Connections: Not on file   Intimate Partner Violence: Not on file   Housing Stability: Not on file         ALLERGIES: Adhesive tape-silicones    Review of Systems   Constitutional:  Positive for appetite change, fatigue and unexpected weight change. HENT: Negative. Gastrointestinal:  Negative for blood in stool. Genitourinary: Negative. All other systems reviewed and are negative. Vitals:    07/25/22 1817 07/25/22 1941   BP: (!) 146/80    Pulse: 94 88   Resp: 18    Temp: 99.3 °F (37.4 °C)    SpO2: 98%    Weight: 58.1 kg (128 lb)    Height: 5' 10\" (1.778 m)             Physical Exam  Vitals and nursing note reviewed. Constitutional:       Appearance: He is ill-appearing. HENT:      Right Ear: External ear normal.      Left Ear: External ear normal.   Eyes:      Pupils: Pupils are equal, round, and reactive to light. Cardiovascular:      Pulses: Normal pulses. Pulmonary:      Effort: Pulmonary effort is normal.   Abdominal:      General: Bowel sounds are normal.   Musculoskeletal:         General: Normal range of motion. Cervical back: Normal range of motion. Skin:     General: Skin is warm. Capillary Refill: Capillary refill takes less than 2 seconds. Neurological:      General: No focal deficit present. Mental Status: He is alert and oriented to person, place, and time.    Psychiatric:         Mood and Affect: Mood normal.         Behavior: Behavior normal.        MDM  Number of Diagnoses or Management Options  Acute renal failure, unspecified acute renal failure type [facility]): new and requires workup  Elevated CK: new and requires workup  Hematuria, unspecified type: new and requires workup  Renal mass: new and requires workup  Weight loss, non-intentional: new and requires workup     Amount and/or Complexity of Data Reviewed  Clinical lab tests: reviewed  Tests in the radiology section of CPT®: reviewed  Discuss the patient with other providers: yes    Risk of Complications, Morbidity, and/or Mortality  Presenting problems: high  Diagnostic procedures: high  Management options: high    Patient Progress  Patient progress: stable    ED Course as of 07/27/22 0026   Mon Jul 25, 2022 2246 No acute process. 2. Indeterminate left renal mass (22 x 25 mm). If patient's GFR returns to  normal or baseline greater than 30, renal protocol CT is recommended for further  evaluation. If not, unenhanced MRI may provide additional information. 3. Moderate emphysema. [GA]      ED Course User Index  [GA] Yancy Quevedo MD       Procedures    Perfect Serve Consult for Admission  9:41 PM    ED Room Number: ER12/12  Patient Name and age:  Kell Rivas 57 y.o.  male  Working Diagnosis:   1. Acute renal failure, unspecified acute renal failure type ([address]    2. Weight loss, non-intentional    3. Elevated CK    4. Hematuria, unspecified type    5. Renal mass        COVID-19 Suspicion:  no  Sepsis present:  no  Reassessment needed: yes  Code Status:  Full Code  Readmission: no  Isolation Requirements:  no  Recommended Level of Care:  med/surg  Department:[facility] ED - [phone]  Other: 58-year-old male presents to the emergency department with over a 45 pound weight loss the past several months. Noted progressively worsening malaise, fatigue, and hematuria. Dates he has done a fairly decent job at keeping himself hydrated but he has been out in the very hot weather. Patient presents to the emergency department with a 45 pound weight loss over the past year and progressively worsening malaise and fatigue. He states over about the past 2 or 3 days he has noted progressively worsening blood in his urine. Dates he is overall weak and presents to the emergency department for further evaluation    He has a slightly elevated CK of 431.   Analysis that shows greater than 300 protein small blood and 5-10 red blood cells. Negative for leukocyte esterase negative for nitrites. BUN and creatinine are 29 and 3.69. CT scan is showing a determinate left renal mass other at emphysema. LABORATORY TESTS:  Recent Results (from the past 12 hour(s))   PTH INTACT    Collection Time: 07/26/22  3:58 PM   Result Value Ref Range    Calcium 9.0 8.5 - 10.1 MG/DL    PTH, Intact 77.3 18.4 - 88.0 pg/mL   HEMOGLOBIN A1C WITH EAG    Collection Time: 07/26/22  3:58 PM   Result Value Ref Range    Hemoglobin A1c 5.2 4.0 - 5.6 %    Est. average glucose 103 mg/dL       IMAGING RESULTS:   US RETROPERITONEUM COMP   Final Result      1. No hydronephrosis. 2. Bilateral echogenic kidneys consistent with medical renal disease. CT CHEST WO CONT   Final Result   Mild emphysema. No concerning nodule or mass. CT ABD PELV WO CONT   Final Result   1. No acute process. 2. Indeterminate left renal mass (22 x 25 mm). If patient's GFR returns to   normal or baseline greater than 30, renal protocol CT is recommended for further   evaluation. If not, unenhanced MRI may provide additional information. 3. Moderate emphysema.             MEDICATIONS GIVEN:  Medications   albuterol-ipratropium (DUO-NEB) 2.5 MG-0.5 MG/3 ML (has no administration in time range)   amLODIPine (NORVASC) tablet 10 mg (10 mg Oral Given 7/26/22 1651)   citalopram (CELEXA) tablet 20 mg (20 mg Oral Given 7/26/22 0903)   HYDROcodone-acetaminophen (NORCO) 5-325 mg per tablet 1 Tablet (has no administration in time range)   traZODone (DESYREL) tablet 50 mg (50 mg Oral Given 7/26/22 2121)   sodium chloride (NS) flush 5-40 mL (10 mL IntraVENous Given 7/26/22 2121)   sodium chloride (NS) flush 5-40 mL (has no administration in time range)   acetaminophen (TYLENOL) tablet 650 mg (has no administration in time range)     Or   acetaminophen (TYLENOL) suppository 650 mg (has no administration in time range)   polyethylene glycol (MIRALAX) packet 17 g (has no administration in time range)   ondansetron (ZOFRAN ODT) tablet 4 mg (has no administration in time range)     Or   ondansetron (ZOFRAN) injection 4 mg (has no administration in time range)   0.9% sodium chloride infusion (100 mL/hr IntraVENous New Bag 7/26/22 1560)   sodium chloride 0.9 % bolus infusion 1,000 mL (0 mL IntraVENous IV Completed 7/25/22 2158)   morphine injection 4 mg (4 mg IntraVENous Given 7/25/22 2159)   ondansetron (ZOFRAN) injection 4 mg (4 mg IntraVENous Given 7/25/22 2159)       IMPRESSION:  1. Acute renal failure, unspecified acute renal failure type ([address]    2. Weight loss, non-intentional    3. Elevated CK    4. Hematuria, unspecified type    5. Renal mass        PLAN:  1.  ADMISSION

## 2022-07-26 NOTE — PROGRESS NOTES
Dictated  Saw dr Yogi Briggs few years ago    Wt loss  ? ? renal mass  Hemorrhagoc renal cyst x 1-2  Madhuri creat   Diarrhea x1  Tobacco  ?? Fanconi      - seros incl gbm  - Us  - pvr    If mass on US needs uro to see

## 2022-07-26 NOTE — PROGRESS NOTES
Xander Nayak [address]  [address] [facility], [address] [place] [address]  [phone]      Medical Progress Note      NAME: Mat Sutton   :  1960  MRM:  [id number]    Date/Time: 2022  6:44 AM           Assessment / Plan:     62M hx COPD, HTN p/w unintentional weight loss, weakness, found to have JANIS w/ proteinuria. #JANIS: Clinical hx might suggest pre-renal etiology, but minimal corresponding azotemia and has not responded to fluid thus far. ATN a consideration, but no casts. Does have blood and significant proteinuria. With weight loss, query malignancy vs auto-immune. Not on nephrotoxic agents as outpt. Mild glucosuria so potentially DM related   - Urine lytes   - UPC   - Continue MIVF for now   - Renal to see, appreciate recs    - MRI vs renal ultrasound   - Renally dose meds, avoid nephrotoxins    #Renal Mass: CT abd/pelv with 22 x 25 mm indeterminate mass. Weight loss and hematuria a concern   - Imaging as above    #FTT/Weight loss: BMI is 18.4. Has lost 45 lb unintentionally. Glucosuria so consider DM, though malig vs autoimmune higher on ddx. Doubt HIV/HIVAN   - PT, OT, nutrition   - A1c    #HTN:    - Amlodipine for now    #COPD: w/o exacerbation    #Depression: Citalopram                     Care Plan discussed with: Patient, Care Manager, and Nursing Staff    Discussed:  Care Plan    Prophylaxis:  Hep SQ    Disposition:  Home w/Family           ___________________________________________________    Attending Physician: Emili Hansen MD        Subjective:     Chief Complaint:  No acute events overnight. Feels better today. Urine is better color. No major complaints. ROS:  (bold if positive, if negative)    Tolerating PT  Tolerating Diet          Objective:       Vitals:          Last 24hrs VS reviewed since prior progress note.  Most recent are:    Visit Vitals  /72   Pulse 73   Temp 99.3 °F (37.4 °C)   Resp 14   Ht 5' 10\" (1.778 m)   Wt 58.1 kg (128 lb)   SpO2 94%   BMI 18.37 kg/m²     SpO2 Readings from Last 6 Encounters:   07/26/22 94%   01/29/22 99%   07/30/17 100%   07/17/17 100%   01/12/17 96%   01/11/17 96%          Intake/Output Summary (Last 24 hours) at 7/26/2022 1763  Last data filed at 7/25/2022 2158  Gross per 24 hour   Intake 1000 ml   Output --   Net 1000 ml          Exam:     Physical Exam:    Gen:  Well-developed, well-nourished, in no acute distress, frail  HEENT:  Pink conjunctivae, PERRL, hearing intact to voice, moist mucous membranes  Neck:  Supple, without masses, thyroid non-tender  Resp:  No accessory muscle use, clear breath sounds without wheezes rales or rhonchi  Card:  No murmurs, normal S1, S2 without thrills, bruits or peripheral edema  Abd:  Soft, non-tender, non-distended, normoactive bowel sounds are present  Musc:  No cyanosis or clubbing  Skin:  No rashes or ulcers, skin turgor is good  Neuro:  Cranial nerves 3-12 are grossly intact,  strength is 5/5 bilaterally and dorsi / plantarflexion is 5/5 bilaterally, follows commands appropriately  Psych:  Good insight, oriented to person, place and time, alert       Medications Reviewed: (see below)    Lab Data Reviewed: (see below)    ______________________________________________________________________    Medications:     Current Facility-Administered Medications   Medication Dose Route Frequency    albuterol-ipratropium (DUO-NEB) 2.5 MG-0.5 MG/3 ML  3 mL Nebulization Q6H PRN    amLODIPine (NORVASC) tablet 10 mg  10 mg Oral DAILY WITH DINNER    citalopram (CELEXA) tablet 20 mg  20 mg Oral DAILY    HYDROcodone-acetaminophen (NORCO) 5-325 mg per tablet 1 Tablet  1 Tablet Oral Q6H PRN    traZODone (DESYREL) tablet 50 mg  50 mg Oral QHS    sodium chloride (NS) flush 5-40 mL  5-40 mL IntraVENous Q8H    sodium chloride (NS) flush 5-40 mL  5-40 mL IntraVENous PRN    acetaminophen (TYLENOL) tablet 650 mg  650 mg Oral Q6H PRN    Or    acetaminophen (TYLENOL) suppository 650 mg  650 mg Rectal Q6H PRN    polyethylene glycol (MIRALAX) packet 17 g  17 g Oral DAILY PRN    ondansetron (ZOFRAN ODT) tablet 4 mg  4 mg Oral Q8H PRN    Or    ondansetron (ZOFRAN) injection 4 mg  4 mg IntraVENous Q6H PRN    0.9% sodium chloride infusion  100 mL/hr IntraVENous CONTINUOUS     Current Outpatient Medications   Medication Sig    HYDROcodone-acetaminophen (NORCO) 5-325 mg per tablet Take 1 Tablet by mouth every six (6) hours as needed. citalopram (CELEXA) 20 mg tablet Take 20 mg by mouth in the morning. traZODone (DESYREL) 50 mg tablet Take 50 mg by mouth two (2) times a day. amLODIPine (NORVASC) 10 mg tablet Take 1 Tab by mouth daily (with dinner). Takes at 1600 daily. acetaminophen-codeine (TYLENOL-CODEINE #3) 300-30 mg per tablet Take 1 Tab by mouth every four (4) hours as needed for Pain. albuterol (PROVENTIL HFA, VENTOLIN HFA, PROAIR HFA) 90 mcg/actuation inhaler Take 2 Puffs by inhalation every six (6) hours as needed for Wheezing.             Lab Review:     Recent Labs     07/25/22  1821   WBC 8.2   HGB 14.4   HCT 42.9        Recent Labs     07/26/22  0415 07/25/22  1821    135*   K 3.7 3.6    101   CO2 24 28   * 136*   BUN 37* 29*   CREA 4.29* 3.69*   CA 8.6 9.1   ALB  --  3.8   ALT  --  24     No components found for: [place]

## 2022-07-27 PROBLEM — E43 SEVERE PROTEIN-CALORIE MALNUTRITION (HCC): Status: ACTIVE | Noted: 2022-07-27

## 2022-07-27 LAB
ANION GAP SERPL CALC-SCNC: 7 MMOL/L (ref 5–15)
BACTERIA SPEC CULT: NORMAL
BUN SERPL-MCNC: 44 MG/DL (ref 6–20)
BUN/CREAT SERPL: 9 (ref 12–20)
C-ANCA TITR SER IF: NORMAL TITER
C3 SERPL-MCNC: 99 MG/DL (ref 82–167)
C4 SERPL-MCNC: 18 MG/DL (ref 12–38)
CALCIUM SERPL-MCNC: 8.6 MG/DL (ref 8.5–10.1)
CALCIUM SERPL-MCNC: 9 MG/DL (ref 8.5–10.1)
CHLORIDE SERPL-SCNC: 107 MMOL/L (ref 97–108)
CK SERPL-CCNC: 199 U/L (ref 39–308)
CO2 SERPL-SCNC: 26 MMOL/L (ref 21–32)
CREAT SERPL-MCNC: 4.94 MG/DL (ref 0.7–1.3)
GBM IGG SER-ACNC: <0.2 UNITS (ref 0–0.9)
GLUCOSE SERPL-MCNC: 146 MG/DL (ref 65–100)
HBV SURFACE AG SER QL: <0.1 INDEX
HBV SURFACE AG SER QL: NEGATIVE
HCV AB SERPL QL IA: NONREACTIVE
HIV 1+2 AB+HIV1 P24 AG SERPL QL IA: NONREACTIVE
HIV12 RESULT COMMENT, HHIVC: NORMAL
KAPPA LC FREE SER-MCNC: 59.7 MG/L (ref 3.3–19.4)
KAPPA LC FREE/LAMBDA FREE SER: 1.88 {RATIO} (ref 0.26–1.65)
LAMBDA LC FREE SERPL-MCNC: 31.7 MG/L (ref 5.7–26.3)
P-ANCA ATYPICAL TITR SER IF: NORMAL TITER
P-ANCA TITR SER IF: NORMAL TITER
POTASSIUM SERPL-SCNC: 4.3 MMOL/L (ref 3.5–5.1)
PTH-INTACT SERPL-MCNC: 77.3 PG/ML (ref 18.4–88)
SERVICE CMNT-IMP: NORMAL
SODIUM SERPL-SCNC: 140 MMOL/L (ref 136–145)

## 2022-07-27 PROCEDURE — 74011000250 HC RX REV CODE- 250: Performed by: STUDENT IN AN ORGANIZED HEALTH CARE EDUCATION/TRAINING PROGRAM

## 2022-07-27 PROCEDURE — 87389 HIV-1 AG W/HIV-1&-2 AB AG IA: CPT

## 2022-07-27 PROCEDURE — 82550 ASSAY OF CK (CPK): CPT

## 2022-07-27 PROCEDURE — 74011250637 HC RX REV CODE- 250/637: Performed by: STUDENT IN AN ORGANIZED HEALTH CARE EDUCATION/TRAINING PROGRAM

## 2022-07-27 PROCEDURE — 36415 COLL VENOUS BLD VENIPUNCTURE: CPT

## 2022-07-27 PROCEDURE — 65270000029 HC RM PRIVATE

## 2022-07-27 PROCEDURE — 80048 BASIC METABOLIC PNL TOTAL CA: CPT

## 2022-07-27 PROCEDURE — 74011250636 HC RX REV CODE- 250/636: Performed by: STUDENT IN AN ORGANIZED HEALTH CARE EDUCATION/TRAINING PROGRAM

## 2022-07-27 PROCEDURE — 51798 US URINE CAPACITY MEASURE: CPT

## 2022-07-27 RX ORDER — IBUPROFEN 200 MG
1 TABLET ORAL DAILY
Status: DISCONTINUED | OUTPATIENT
Start: 2022-07-28 | End: 2022-07-27

## 2022-07-27 RX ORDER — IBUPROFEN 200 MG
1 TABLET ORAL DAILY
Status: DISCONTINUED | OUTPATIENT
Start: 2022-07-27 | End: 2022-07-29 | Stop reason: HOSPADM

## 2022-07-27 RX ORDER — IBUPROFEN 200 MG
1 TABLET ORAL DAILY
Status: DISCONTINUED | OUTPATIENT
Start: 2022-07-27 | End: 2022-07-27

## 2022-07-27 RX ADMIN — AMLODIPINE BESYLATE 10 MG: 5 TABLET ORAL at 16:03

## 2022-07-27 RX ADMIN — SODIUM CHLORIDE 100 ML/HR: 9 INJECTION, SOLUTION INTRAVENOUS at 00:31

## 2022-07-27 RX ADMIN — HYDROCODONE BITARTRATE AND ACETAMINOPHEN 1 TABLET: 5; 325 TABLET ORAL at 21:42

## 2022-07-27 RX ADMIN — Medication 10 ML: at 07:02

## 2022-07-27 RX ADMIN — Medication 10 ML: at 21:44

## 2022-07-27 RX ADMIN — CITALOPRAM HYDROBROMIDE 20 MG: 20 TABLET ORAL at 08:59

## 2022-07-27 RX ADMIN — TRAZODONE HYDROCHLORIDE 50 MG: 50 TABLET ORAL at 21:42

## 2022-07-27 RX ADMIN — SODIUM CHLORIDE 100 ML/HR: 9 INJECTION, SOLUTION INTRAVENOUS at 15:15

## 2022-07-27 NOTE — PROGRESS NOTES
Comprehensive Nutrition Assessment    Type and Reason for Visit: Initial (Low BMI)    Nutrition Recommendations/Plan:   Continue regular diet  Provide Ensure Enlive BID to increase kcal/protein intake (700 kcal, 88 g Carbs, 40 g protein)  Check mag and phos      Malnutrition Assessment:  Malnutrition Status:  Severe malnutrition (07/27/22 1025)    Context:  Chronic illness     Findings of the 6 clinical characteristics of malnutrition:   Energy Intake:  No significant decrease in energy intake  Weight Loss:  Greater than 20% over 1 year     Body Fat Loss:  Severe body fat loss, Fat overlying ribs   Muscle Mass Loss:  Mild muscle mass loss, Hand (interosseous), Clavicles (pectoralis &deltoids)  Fluid Accumulation:  No significant fluid accumulation,     Strength:  Not performed     Nutrition Assessment:     Pt is a 57year old male admitted with Acute renal failure (ARF) ([facility] [N17.9]. He  has a past medical history of Chronic obstructive pulmonary disease ([facility] Chronic pain, and Hypertension. RD screen for low BMI. Noted patient c/o 45# wt loss and Nutrition Screen was not filled out on admission. Lack of recent weight history, appears to have lost 9# (6.5%) within 6 months, not clinically significant for timeframe. Admission weight appears stated, RD ordered measured weight. No PO intake documented at this time. Patient states #, and reports weight loss in last year. Current measured weight is a 43# (24.5%) loss from stated UBW. Patient endorses stopping alcohol consumption x 3 years ago but suggests his weight loss is related to this. No N/V/D at this time. Wears dentures and has them with him. Reports some lower abd pain after meals, does not seem to be related to any particular food groups/fatty foods. Voices acceptance of ONS BID to increase kcal/protein intake. NKFA.     Wt Readings from Last 10 Encounters:   07/27/22 60.1 kg (132 lb 7.9 oz)   01/29/22 62.1 kg (137 lb)   07/30/17 77.1 kg (170 lb) 07/17/17 74 kg (163 lb 2.3 oz)   01/12/17 73.3 kg (161 lb 9.6 oz)   01/11/17 73.3 kg (161 lb 9.6 oz)   11/18/16 71 kg (156 lb 8.4 oz)   03/31/16 78.5 kg (173 lb)       Nutrition Related Findings:      Wound Type: None   Edema:No data recorded    Nutr. Labs:    Lab Results   Component Value Date/Time    GFR est AA 14 (L) 07/27/2022 08:22 AM    GFR est non-AA 12 (L) 07/27/2022 08:22 AM    Creatinine 4.94 (H) 07/27/2022 08:22 AM    BUN 44 (H) 07/27/2022 08:22 AM    Sodium 140 07/27/2022 08:22 AM    Potassium 4.3 07/27/2022 08:22 AM    Chloride 107 07/27/2022 08:22 AM    CO2 26 07/27/2022 08:22 AM       Lab Results   Component Value Date/Time    Glucose 146 (H) 07/27/2022 08:22 AM       Lab Results   Component Value Date/Time    Hemoglobin A1c 5.2 07/26/2022 03:58 PM     Magnesium   Date Value Ref Range Status   07/30/2017 1.8 1.6 - 2.4 mg/dL Final     Lab Results   Component Value Date/Time    Calcium 8.6 07/27/2022 08:22 AM       Nutr. Meds:  Norvasc, celexa, zofran PRN, miralax PRN     Current Nutrition Intake & Therapies:  Average Meal Intake: %  Average Supplement Intake: None ordered  ADULT DIET Regular  ADULT ORAL NUTRITION SUPPLEMENT Lunch, Dinner; Standard High Calorie/High Protein    Anthropometric Measures:  Height: 5' 10\" (177.8 cm)  Ideal Body Weight (IBW): 166 lbs (75 kg)     Current Body Wt:  58.1 kg (128 lb), 77.1 % IBW.  Stated  Current BMI (kg/m2): 18.4  Usual Body Weight: 79.4 kg (175 lb)  % Weight Change (Calculated): -26.9  Weight Adjustment: No adjustment                 BMI Category: Underweight (BMI less than 18.5)    Estimated Daily Nutrient Needs:  Energy Requirements Based On: Kcal/kg  Weight Used for Energy Requirements: Current  Energy (kcal/day): 2646-0987 (25-30 kcal/kg)  Weight Used for Protein Requirements: Ideal  Protein (g/day): 75 (1.0 g/kg IBW)  Method Used for Fluid Requirements: 1 ml/kcal  Fluid (ml/day): 8700-9647    Nutrition Diagnosis:   Severe malnutrition related to impaired nutrient utilization (predicted inadequate protein-energy intake) as evidenced by weight loss, Criteria as identified in malnutrition assessment      Nutrition Interventions:   Food and/or Nutrient Delivery: Continue current diet, Start oral nutrition supplement  Nutrition Education/Counseling: No recommendations at this time  Coordination of Nutrition Care: Continue to monitor while inpatient, Interdisciplinary rounds  Plan of Care discussed with: IDR team    Goals:     Goals: Meet at least 75% of estimated needs, by next RD assessment       Nutrition Monitoring and Evaluation:   Behavioral-Environmental Outcomes: None identified  Food/Nutrient Intake Outcomes: Food and nutrient intake, Supplement intake  Physical Signs/Symptoms Outcomes: Biochemical data, Chewing or swallowing, GI status, Weight    Discharge Planning:     Too soon to determine    Sarah Nevarez MS, RD  Contact: Ext: [phone], or via [employer]

## 2022-07-27 NOTE — PROGRESS NOTES
[facility] [place] [address]  [address]  [phone]      Medical Progress Note      NAME: Vasyl Issa   :  1960  MRM:  [id number]    Date/Time: 2022         Assessment / Plan:     62M hx COPD, HTN p/w unintentional weight loss, weakness, found to have JANIS w/ proteinuria. #JANIS: FENa is intrinsic, and w/ hematuria, proteinuria (2.5 g/day), unexplained glucosuria, along with weight loss, do have c/f true GN. HCV neg. PTH suggesting thi sis not CKD. No arthralgias, skin rash. UOP still good thus far    - EMERALD, ANCA, C3,C4, Anti-GBM, SPEP, UPEP, light chains, HIV   - Appreciate Renal following    - Dialysis Friday if not improving; biopsy next week if Cr not improving    #Renal Mass: CT abd/pelv with 22 x 25 mm indeterminate mass. But renal ultrasound with simple cyst.       #FTT/Weight loss: BMI is 18.4. Has lost 45 lb unintentionally. Glucosuria but A1c normal. Malig vs autoimmune higher on ddx. Doubt HIV/HIVAN but will check serologies as possible presentation   - PT, OT, nutrition      #HTN:    - Amlodipine for now    #COPD: w/o exacerbation    #Depression: Citalopram    #Tobacco use:    - Nicotine patch                     Care Plan discussed with: Patient, Care Manager, and Nursing Staff    Discussed:  Care Plan    Prophylaxis:  Hep SQ    Disposition:  Home w/Family           ___________________________________________________    Attending Physician: Erin Hamlin MD        Subjective:     Chief Complaint:  No acute events overnight. He feels a little better. No major complaints, but would like nicotine patch. Reviewed hx EtOH and he notes sobriety x 3 years. ROS:  (bold if positive, if negative)    Tolerating PT  Tolerating Diet          Objective:       Vitals:          Last 24hrs VS reviewed since prior progress note.  Most recent are:    Visit Vitals  BP (!) 148/76 (BP 1 Location: Right upper arm, BP Patient Position: Lying)   Pulse 81 Temp 98.3 °F (36.8 °C)   Resp 18   Ht 5' 10\" (1.778 m)   Wt 58.1 kg (128 lb)   SpO2 95%   BMI 18.37 kg/m²     SpO2 Readings from Last 6 Encounters:   07/27/22 95%   01/29/22 99%   07/30/17 100%   07/17/17 100%   01/12/17 96%   01/11/17 96%          Intake/Output Summary (Last 24 hours) at 7/27/2022 8979  Last data filed at 7/27/2022 0320  Gross per 24 hour   Intake --   Output 0 ml   Net 0 ml            Exam:     Physical Exam:    Gen:  Well-developed, well-nourished, in no acute distress, frail  HEENT:  Pink conjunctivae, PERRL, hearing intact to voice, moist mucous membranes  Neck:  Supple, without masses, thyroid non-tender  Resp:  No accessory muscle use, clear breath sounds without wheezes rales or rhonchi  Card:  No murmurs, normal S1, S2 without thrills, bruits or peripheral edema  Abd:  Soft, non-tender, non-distended, normoactive bowel sounds are present  Musc:  No cyanosis or clubbing  Skin:  No rashes or ulcers, skin turgor is good  Neuro:  Cranial nerves 3-12 are grossly intact,  strength is 5/5 bilaterally and dorsi / plantarflexion is 5/5 bilaterally, follows commands appropriately  Psych:  Good insight, oriented to person, place and time, alert       Medications Reviewed: (see below)    Lab Data Reviewed: (see below)    ______________________________________________________________________    Medications:     Current Facility-Administered Medications   Medication Dose Route Frequency    albuterol-ipratropium (DUO-NEB) 2.5 MG-0.5 MG/3 ML  3 mL Nebulization Q6H PRN    amLODIPine (NORVASC) tablet 10 mg  10 mg Oral DAILY WITH DINNER    citalopram (CELEXA) tablet 20 mg  20 mg Oral DAILY    HYDROcodone-acetaminophen (NORCO) 5-325 mg per tablet 1 Tablet  1 Tablet Oral Q6H PRN    traZODone (DESYREL) tablet 50 mg  50 mg Oral QHS    sodium chloride (NS) flush 5-40 mL  5-40 mL IntraVENous Q8H    sodium chloride (NS) flush 5-40 mL  5-40 mL IntraVENous PRN    acetaminophen (TYLENOL) tablet 650 mg  650 mg Oral Q6H PRN    Or    acetaminophen (TYLENOL) suppository 650 mg  650 mg Rectal Q6H PRN    polyethylene glycol (MIRALAX) packet 17 g  17 g Oral DAILY PRN    ondansetron (ZOFRAN ODT) tablet 4 mg  4 mg Oral Q8H PRN    Or    ondansetron (ZOFRAN) injection 4 mg  4 mg IntraVENous Q6H PRN    0.9% sodium chloride infusion  100 mL/hr IntraVENous CONTINUOUS            Lab Review:     Recent Labs     07/25/22  1821   WBC 8.2   HGB 14.4   HCT 42.9          Recent Labs     07/26/22  1558 07/26/22  0415 07/25/22  1821   NA  --  136 135*   K  --  3.7 3.6   CL  --  104 101   CO2  --  24 28   GLU  --  120* 136*   BUN  --  37* 29*   CREA  --  4.29* 3.69*   CA 9.0 8.6 9.1   ALB  --   --  3.8   ALT  --   --  24       No components found for: [place]

## 2022-07-27 NOTE — PROGRESS NOTES
7/27/2022  Case Management Progress Note    10:00 AM  Patient is 57year old male admitted 7/25 with acute renal failure  Patient's RUR is 7% green/low risk for readmission  Covid test: none this admission  Chart reviewed  Per chart patient has been cleared by therapy yesterday. He has been seen by nephrology and they will continue to follow. Patient lives with his spouse and is independent with ADLs at baseline. No identified needs at this time, plan for patient to return home with family assistance unless otherwise indicated. Will continue to follow and assist as needed.      Transition of Care Plan   Continue medical management/treatment  Home with family when medically ready   Family will transport at discharge   Follow up outpatient as indicated  CM will continue to follow and assist with discharge Oliver Coe, MSW

## 2022-07-27 NOTE — PROGRESS NOTES
1130  PVR 11 mL - nephrology aware, no need to continue bladder checks per MD    1900  Bedside and Verbal shift change report given to PEREZ RN (oncoming nurse) by Ilan Liu RN (offgoing nurse). Report included the following information SBAR, Kardex, Intake/Output, MAR, and Recent Results.

## 2022-07-27 NOTE — PROGRESS NOTES
[address] [facility]  YOB: 1960          Assessment & Plan:     JANIS ? etiology Not better with IVF. ? ATN ? GN  Subnephrotic proteinuria  Weight loss  COPD/tobacco abuse  L renal cyst by US    Rec:  No acute indication HD  Serologies pending  Proteinuria could be due to nodular GS from smoking or secondary FSGS or a primary glomerular condition  Continue IVF  If no stabilization by Friday, start dialysis  If renal function not improving by Monday, will consider renal biopsy  Discussed plan with patient       Subjective:   CC: JANIS  HPI: Creat continues to rise. On IVF, BP stable on amlodipine. ROS: foamy urine, no edema, no sob/n/v/d  Current Facility-Administered Medications   Medication Dose Route Frequency    albuterol-ipratropium (DUO-NEB) 2.5 MG-0.5 MG/3 ML  3 mL Nebulization Q6H PRN    amLODIPine (NORVASC) tablet 10 mg  10 mg Oral DAILY WITH DINNER    citalopram (CELEXA) tablet 20 mg  20 mg Oral DAILY    HYDROcodone-acetaminophen (NORCO) 5-325 mg per tablet 1 Tablet  1 Tablet Oral Q6H PRN    traZODone (DESYREL) tablet 50 mg  50 mg Oral QHS    sodium chloride (NS) flush 5-40 mL  5-40 mL IntraVENous Q8H    sodium chloride (NS) flush 5-40 mL  5-40 mL IntraVENous PRN    acetaminophen (TYLENOL) tablet 650 mg  650 mg Oral Q6H PRN    Or    acetaminophen (TYLENOL) suppository 650 mg  650 mg Rectal Q6H PRN    polyethylene glycol (MIRALAX) packet 17 g  17 g Oral DAILY PRN    ondansetron (ZOFRAN ODT) tablet 4 mg  4 mg Oral Q8H PRN    Or    ondansetron (ZOFRAN) injection 4 mg  4 mg IntraVENous Q6H PRN    0.9% sodium chloride infusion  100 mL/hr IntraVENous CONTINUOUS          Objective:     Vitals:  Blood pressure 138/75, pulse 72, temperature 98.1 °F (36.7 °C), resp. rate 16, height 5' 10\" (1.778 m), weight 60.1 kg (132 lb 7.9 oz), SpO2 95 %.   Temp (24hrs), Av.2 °F (36.8 °C), Min:97.9 °F (36.6 °C), Max:98.8 °F (37.1 °C)      Intake and Output:  No intake/output data recorded. 07/25 1901 - 07/27 0700  In: 1000 [I.V.:1000]  Out: 0     Physical Exam:               Physical Examination:   GENERAL ASSESSMENT: Thin, NAD  HEENT: Nontraumatic   CHEST: CTA  HEART: S1S2  ABDOMEN: Soft,NT  EXTREMITY: no EDEMA  NEURO: Grossly non focal          ECG/rhythm:    Data Review      No results for input(s): TNIPOC in the last 72 hours. No lab exists for component: ITNL   Recent Labs     07/25/22  1821   *     Recent Labs     07/27/22  0822 07/26/22  1558 07/26/22  0415 07/25/22  1821     --  136 135*   K 4.3  --  3.7 3.6     --  104 101   CO2 26  --  24 28   BUN 44*  --  37* 29*   CREA 4.94*  --  4.29* 3.69*   *  --  120* 136*   CA 8.6 9.0 8.6 9.1   ALB  --   --   --  3.8   WBC  --   --   --  8.2   HGB  --   --   --  14.4   HCT  --   --   --  42.9   PLT  --   --   --  200      No results for input(s): INR, PTP, APTT, INREXT in the last 72 hours. Needs: urine analysis, urine sodium, protein and creatinine  Lab Results   Component Value Date/Time    Sodium,urine random 43 07/26/2022 07:42 AM    Creatinine, urine 56.00 07/26/2022 07:42 AM    Creatinine, urine 53.00 07/26/2022 07:42 AM           : Gonzales Felix MD  7/27/2022        [facility]:  [email or web address]  Jesusita Chen office:  [address] [place] [address], [address] [facility] [address]  [place], [address] [place] [address]  Phone: [phone]  Fax :     [phone]    [facility] office:  [address] [facility], [address]  Phone - [phone]  Fax - [phone]

## 2022-07-27 NOTE — PROGRESS NOTES
Problem: General Medical Care Plan  Goal: *Absence of infection signs and symptoms  Outcome: Progressing Towards Goal  Goal: *Optimize nutritional status  Outcome: Progressing Towards Goal     Problem: Falls - Risk of  Goal: *Absence of Falls  Description: Document Charan Fall Risk and appropriate interventions in the flowsheet.   Outcome: Progressing Towards Goal  Note: Fall Risk Interventions:            Medication Interventions: Evaluate medications/consider consulting pharmacy                   Problem: Patient Education: Go to Patient Education Activity  Goal: Patient/Family Education  Outcome: Progressing Towards Goal

## 2022-07-27 NOTE — PROGRESS NOTES
5226- Post void bladder scan completed; was 79mls, 14mls.  Will continue to monitor and f/up with needs as they arise

## 2022-07-28 LAB
ALBUMIN SERPL-MCNC: 2.8 G/DL (ref 3.5–5)
ANA SER QL: NEGATIVE
ANION GAP SERPL CALC-SCNC: 6 MMOL/L (ref 5–15)
BUN SERPL-MCNC: 51 MG/DL (ref 6–20)
BUN/CREAT SERPL: 11 (ref 12–20)
CALCIUM SERPL-MCNC: 8.6 MG/DL (ref 8.5–10.1)
CHLORIDE SERPL-SCNC: 107 MMOL/L (ref 97–108)
CO2 SERPL-SCNC: 24 MMOL/L (ref 21–32)
CREAT SERPL-MCNC: 4.71 MG/DL (ref 0.7–1.3)
GLUCOSE SERPL-MCNC: 122 MG/DL (ref 65–100)
PERIPHERAL SMEAR,PSM: NORMAL
PHOSPHATE SERPL-MCNC: 5.8 MG/DL (ref 2.6–4.7)
POTASSIUM SERPL-SCNC: 4.1 MMOL/L (ref 3.5–5.1)
SODIUM SERPL-SCNC: 137 MMOL/L (ref 136–145)

## 2022-07-28 PROCEDURE — 74011250636 HC RX REV CODE- 250/636: Performed by: INTERNAL MEDICINE

## 2022-07-28 PROCEDURE — 80069 RENAL FUNCTION PANEL: CPT

## 2022-07-28 PROCEDURE — 74011250637 HC RX REV CODE- 250/637: Performed by: STUDENT IN AN ORGANIZED HEALTH CARE EDUCATION/TRAINING PROGRAM

## 2022-07-28 PROCEDURE — 36415 COLL VENOUS BLD VENIPUNCTURE: CPT

## 2022-07-28 PROCEDURE — 74011000250 HC RX REV CODE- 250: Performed by: STUDENT IN AN ORGANIZED HEALTH CARE EDUCATION/TRAINING PROGRAM

## 2022-07-28 PROCEDURE — 74011250636 HC RX REV CODE- 250/636: Performed by: STUDENT IN AN ORGANIZED HEALTH CARE EDUCATION/TRAINING PROGRAM

## 2022-07-28 PROCEDURE — 65270000029 HC RM PRIVATE

## 2022-07-28 RX ADMIN — Medication 10 ML: at 06:36

## 2022-07-28 RX ADMIN — HYDROCODONE BITARTRATE AND ACETAMINOPHEN 1 TABLET: 5; 325 TABLET ORAL at 16:31

## 2022-07-28 RX ADMIN — Medication 10 ML: at 21:23

## 2022-07-28 RX ADMIN — TRAZODONE HYDROCHLORIDE 50 MG: 50 TABLET ORAL at 21:20

## 2022-07-28 RX ADMIN — SODIUM CHLORIDE 100 ML/HR: 9 INJECTION, SOLUTION INTRAVENOUS at 01:07

## 2022-07-28 RX ADMIN — AMLODIPINE BESYLATE 10 MG: 5 TABLET ORAL at 16:23

## 2022-07-28 RX ADMIN — SODIUM CHLORIDE 50 ML/HR: 9 INJECTION, SOLUTION INTRAVENOUS at 13:00

## 2022-07-28 RX ADMIN — CITALOPRAM HYDROBROMIDE 20 MG: 20 TABLET ORAL at 08:53

## 2022-07-28 NOTE — PROGRESS NOTES
..                            [address] [facility]  YOB: 1960          Assessment & Plan:     JANIS ? etiology Not better with IVF. ? ATN ? GN  Subnephrotic proteinuria  Weight loss  COPD/tobacco abuse  L renal cyst by US     Rec:  No acute indication HD  Serologies: EMERALD still pending. K/L ratio 1.8. ANCA neg. Compl wnl. Anti-GBM wnl. Proteinuria could be due to nodular GS from smoking or secondary FSGS or a primary glomerular condition  Continue IVF: cut back on rate   Dr. Shawn Davila back in the am  Discussed plan with patient        Subjective:   CC: JANIS  HPI: Patient seen and examined. Creat has improved. He reports good UO and that it has picked up. ROS: Denies sob  Current Facility-Administered Medications   Medication Dose Route Frequency    nicotine (NICODERM CQ) 14 mg/24 hr patch 1 Patch  1 Patch TransDERmal DAILY    albuterol-ipratropium (DUO-NEB) 2.5 MG-0.5 MG/3 ML  3 mL Nebulization Q6H PRN    amLODIPine (NORVASC) tablet 10 mg  10 mg Oral DAILY WITH DINNER    citalopram (CELEXA) tablet 20 mg  20 mg Oral DAILY    HYDROcodone-acetaminophen (NORCO) 5-325 mg per tablet 1 Tablet  1 Tablet Oral Q6H PRN    traZODone (DESYREL) tablet 50 mg  50 mg Oral QHS    sodium chloride (NS) flush 5-40 mL  5-40 mL IntraVENous Q8H    sodium chloride (NS) flush 5-40 mL  5-40 mL IntraVENous PRN    acetaminophen (TYLENOL) tablet 650 mg  650 mg Oral Q6H PRN    Or    acetaminophen (TYLENOL) suppository 650 mg  650 mg Rectal Q6H PRN    polyethylene glycol (MIRALAX) packet 17 g  17 g Oral DAILY PRN    ondansetron (ZOFRAN ODT) tablet 4 mg  4 mg Oral Q8H PRN    Or    ondansetron (ZOFRAN) injection 4 mg  4 mg IntraVENous Q6H PRN    0.9% sodium chloride infusion  50 mL/hr IntraVENous CONTINUOUS          Objective:     Vitals:  Blood pressure (!) 158/88, pulse 76, temperature 97.8 °F (36.6 °C), resp. rate 18, height 5' 10\" (1.778 m), weight 63.9 kg (140 lb 14 oz), SpO2 97 %.   Temp (24hrs), Av.2 °F (36.8 °C), Min:97.7 °F (36.5 °C), Max:99.8 °F (37.7 °C)      Intake and Output:  701 - 1900  In: 125 [P.O.:125]  Out: 0   1901 - 700  In: 2150 [P.O.:150; I.V.:2000]  Out: 100 [Urine:100]    Physical Exam:                Patient is intubated:  no    Physical Examination:   GENERAL ASSESSMENT: NAD  HEENT:Nontraumatic   CHEST: symmetric lung expansion   :Collado: n  EXTREMITY: EDEMA - none  NEURO:Grossly non focal          ECG/rhythm:    Data Review      No results for input(s): TNIPOC in the last 72 hours. No lab exists for component: ITNL   Recent Labs     22  0822 22  1821    431*     Recent Labs     22  0115 22  0822 22  1558 22  0415 22  1821    140  --  136 135*   K 4.1 4.3  --  3.7 3.6    107  --  104 101   CO2 24 26  --  24 28   BUN 51* 44*  --  37* 29*   CREA 4.71* 4.94*  --  4.29* 3.69*   * 146*  --  120* 136*   PHOS 5.8*  --   --   --   --    CA 8.6 8.6 9.0 8.6 9.1   ALB 2.8*  --   --   --  3.8   WBC  --   --   --   --  8.2   HGB  --   --   --   --  14.4   HCT  --   --   --   --  42.9   PLT  --   --   --   --  200      No results for input(s): INR, PTP, APTT, INREXT in the last 72 hours. Needs: urine analysis, urine sodium, protein and creatinine  Lab Results   Component Value Date/Time    Sodium,urine random 43 2022 07:42 AM    Creatinine, urine 56.00 2022 07:42 AM    Creatinine, urine 53.00 2022 07:42 AM           : Nessa Salazar MD  2022        [facility]:  [email or web address]. [email or web address]  Tran Marjoseph office:  [address] [place] [address],[address]  [address]  Phone: [phone]  Fax :     [phone]    Teresa office:  [address] [place] [address]  [facility]  Phone - [phone]  Fax - [phone]

## 2022-07-28 NOTE — PROGRESS NOTES
[facility]  [address] [facility], [place], [address] [place] [address]  [phone]      Medical Progress Note      NAME: Camilla Malhotra   :  1960  MRM:  [id number]    Date/Time: 2022         Assessment / Plan:     62M hx COPD, HTN p/w unintentional weight loss, weakness, found to have JANIS w/ proteinuria. #JANIS: FENa is intrinsic, and w/ hematuria, proteinuria (2.5 g/day). HCV, ANCA, C3,C4, Anti-GBM, HIV neg so far. Light chains abnl so may need to consider myeloma kidney, amyloid. UOP improving   - Appreciate Renal following   - Peripheral smear    #Renal Mass: CT abd/pelv with 22 x 25 mm indeterminate mass. But renal ultrasound with simple cyst.       #FTT/Weight loss: BMI is 18.4. Has lost 45 lb unintentionally. Glucosuria but A1c normal. Malig still on ddx   - PT, OT, nutrition   - Peripheral smear   - Outpt colo      #HTN:    - Amlodipine for now; eventual ACEi/ARB for proteinuria    #COPD: w/o exacerbation    #Depression: Citalopram    #Tobacco use:    - Nicotine patch                     Care Plan discussed with: Patient, Care Manager, and Nursing Staff    Discussed:  Care Plan    Prophylaxis:  Hep SQ    Disposition:  Home w/Family           ___________________________________________________    Attending Physician: Julio Arnett MD        Subjective:     Chief Complaint:  No acute events overnight. Feels fine today without complaints. Notes urine is a little more foamy    ROS:  (bold if positive, if negative)    Tolerating PT  Tolerating Diet          Objective:       Vitals:          Last 24hrs VS reviewed since prior progress note.  Most recent are:    Visit Vitals  BP (!) 158/88 (BP 1 Location: Right upper arm, BP Patient Position: Lying)   Pulse 76   Temp 97.8 °F (36.6 °C)   Resp 18   Ht 5' 10\" (1.778 m)   Wt 60.1 kg (132 lb 7.9 oz)   SpO2 97%   BMI 19.01 kg/m²     SpO2 Readings from Last 6 Encounters:   22 97%   22 99%   17 100%   17 100% 01/12/17 96%   01/11/17 96%          Intake/Output Summary (Last 24 hours) at 7/28/2022 1052  Last data filed at 7/27/2022 2022  Gross per 24 hour   Intake 2150 ml   Output 100 ml   Net 2050 ml            Exam:     Physical Exam:    Gen:  Well-developed, well-nourished, in no acute distress, frail  HEENT:  Pink conjunctivae, PERRL, hearing intact to voice, moist mucous membranes  Neck:  Supple, without masses, thyroid non-tender  Resp:  No accessory muscle use, clear breath sounds without wheezes rales or rhonchi  Card:  No murmurs, normal S1, S2 without thrills, bruits or peripheral edema  Abd:  Soft, non-tender, non-distended, normoactive bowel sounds are present  Musc:  No cyanosis or clubbing  Skin:  No rashes or ulcers, skin turgor is good  Neuro:  Cranial nerves 3-12 are grossly intact,  strength is 5/5 bilaterally and dorsi / plantarflexion is 5/5 bilaterally, follows commands appropriately  Psych:  Good insight, oriented to person, place and time, alert       Medications Reviewed: (see below)    Lab Data Reviewed: (see below)    ______________________________________________________________________    Medications:     Current Facility-Administered Medications   Medication Dose Route Frequency    nicotine (NICODERM CQ) 14 mg/24 hr patch 1 Patch  1 Patch TransDERmal DAILY    albuterol-ipratropium (DUO-NEB) 2.5 MG-0.5 MG/3 ML  3 mL Nebulization Q6H PRN    amLODIPine (NORVASC) tablet 10 mg  10 mg Oral DAILY WITH DINNER    citalopram (CELEXA) tablet 20 mg  20 mg Oral DAILY    HYDROcodone-acetaminophen (NORCO) 5-325 mg per tablet 1 Tablet  1 Tablet Oral Q6H PRN    traZODone (DESYREL) tablet 50 mg  50 mg Oral QHS    sodium chloride (NS) flush 5-40 mL  5-40 mL IntraVENous Q8H    sodium chloride (NS) flush 5-40 mL  5-40 mL IntraVENous PRN    acetaminophen (TYLENOL) tablet 650 mg  650 mg Oral Q6H PRN    Or    acetaminophen (TYLENOL) suppository 650 mg  650 mg Rectal Q6H PRN    polyethylene glycol (MIRALAX) packet 17 g  17 g Oral DAILY PRN    ondansetron (ZOFRAN ODT) tablet 4 mg  4 mg Oral Q8H PRN    Or    ondansetron (ZOFRAN) injection 4 mg  4 mg IntraVENous Q6H PRN    0.9% sodium chloride infusion  100 mL/hr IntraVENous CONTINUOUS            Lab Review:     Recent Labs     07/25/22  1821   WBC 8.2   HGB 14.4   HCT 42.9          Recent Labs     07/28/22  0115 07/27/22  0822 07/26/22  1558 07/26/22  0415 07/25/22  1821    140  --  136 135*   K 4.1 4.3  --  3.7 3.6    107  --  104 101   CO2 24 26  --  24 28   * 146*  --  120* 136*   BUN 51* 44*  --  37* 29*   CREA 4.71* 4.94*  --  4.29* 3.69*   CA 8.6 8.6 9.0 8.6 9.1   PHOS 5.8*  --   --   --   --    ALB 2.8*  --   --   --  3.8   ALT  --   --   --   --  24       No components found for: [place]

## 2022-07-28 NOTE — PROGRESS NOTES
7/28/2022  Case Management Progress Note    9:45 AM  Patient is 57year old male admitted 7/25 with acute renal failure  Patient's RUR is 8% green/low risk for readmission  Covid test: none this admission  Chart reviewed  Per nephrology note, if patient's labs haven't stabilized by tomorrow may need dialysis and if no improvement by Monday may need biopsy. Patient does not have any noted skilled needs from CM standpoint at this time; will continue to follow as clinical course continues.      Transition of Care Plan  Continue medical management/treatment  Home with family assistance when medically ready to do so   Family will transport at discharge  Follow up outpatient as indicated  CM will continue to follow    CASTRO Pickett

## 2022-07-28 NOTE — PROGRESS NOTES
Spiritual Care Assessment/Progress Note  [address]      NAME: Marylee Blue      MRN: [id number]  AGE: 57 y.o. SEX: male  Hoahaoism Affiliation: Adventist   Language: English     7/28/2022     Total Time (in minutes): 17     Spiritual Assessment begun in [facility] 5M1 MED SURG 1 through conversation with:         []Patient        [] Family    [] Friend(s)        Reason for Consult: Initial visit     Spiritual beliefs: (Please include comment if needed)     [] Identifies with a pasha tradition:         [] Supported by a pasha community:            [] Claims no spiritual orientation:           [] Seeking spiritual identity:                [] Adheres to an individual form of spirituality:           [x] Not able to assess:                           Identified resources for coping:      [] Prayer                               [] Music                  [] Guided Imagery     [] Family/friends                 [] Pet visits     [] Devotional reading                         [x] Unknown     [] Other:                                               Interventions offered during this visit: (See comments for more details)                Plan of Care:     [] Support spiritual and/or cultural needs    [] Support AMD and/or advance care planning process      [] Support grieving process   [] Coordinate Rites and/or Rituals    [] Coordination with community clergy   [] No spiritual needs identified at this time   [] Detailed Plan of Care below (See Comments)  [] Make referral to Music Therapy  [] Make referral to Pet Therapy     [] Make referral to Addiction services  [] Make referral to [facility]  [] Make referral to Spiritual Care Partner  [] No future visits requested        [] Contact Spiritual Care for further referrals     Comments: Visit for spiritual assessment in [facility]. Patient out of room for medical procedure. No family present. Left visitation care.   Please contact Spiritual Care for any further referrals. Visited by: Anali Thakur.  Maksim Watson, [address] [place] [address] Road paging Service [phone] ([phone]

## 2022-07-28 NOTE — PROGRESS NOTES
Bedside and Verbal shift change report given to CHICHI TODD (oncoming nurse) by Darren De Jesus RN (offgoing nurse). Report included the following information SBAR, Kardex, Intake/Output, MAR, and Recent Results.

## 2022-07-28 NOTE — PROGRESS NOTES
Bedside, Verbal, and Written shift change report given to [address] [place] [address] (oncoming nurse) by Theron Rodriguez   RN (offgoing nurse).  Report included the following information SBAR, Kardex, Procedure Summary, Intake/Output, MAR, Accordion, Recent Results, Med Rec Status, Alarm Parameters , and Pre Procedure Checklist.

## 2022-07-28 NOTE — PROGRESS NOTES
Bedside, Verbal, and Written shift change report given to [facility] (oncoming nurse) by Nikolas Garber RN (offgoing nurse). Report included the following information SBAR, Kardex, Procedure Summary, Intake/Output, MAR, Recent Results, Med Rec Status, Alarm Parameters , and Procedure Verification.

## 2022-07-28 NOTE — PROGRESS NOTES
Problem: General Medical Care Plan  Goal: *Absence of infection signs and symptoms  Outcome: Progressing Towards Goal  Goal: *Optimize nutritional status  Outcome: Progressing Towards Goal     Problem: Falls - Risk of  Goal: *Absence of Falls  Description: Document Charan Fall Risk and appropriate interventions in the flowsheet.   Outcome: Progressing Towards Goal  Note: Fall Risk Interventions:            Medication Interventions: Patient to call before getting OOB                   Problem: Patient Education: Go to Patient Education Activity  Goal: Patient/Family Education  Outcome: Progressing Towards Goal     Problem: Nutrition Deficit  Goal: *Optimize nutritional status  Outcome: Progressing Towards Goal

## 2022-07-29 VITALS
TEMPERATURE: 98 F | SYSTOLIC BLOOD PRESSURE: 130 MMHG | OXYGEN SATURATION: 98 % | HEART RATE: 81 BPM | BODY MASS INDEX: 20.17 KG/M2 | RESPIRATION RATE: 16 BRPM | HEIGHT: 70 IN | DIASTOLIC BLOOD PRESSURE: 77 MMHG | WEIGHT: 140.87 LBS

## 2022-07-29 LAB
ALBUMIN SERPL-MCNC: 3.1 G/DL (ref 3.5–5)
ANION GAP SERPL CALC-SCNC: 4 MMOL/L (ref 5–15)
BUN SERPL-MCNC: 47 MG/DL (ref 6–20)
BUN/CREAT SERPL: 12 (ref 12–20)
CALCIUM SERPL-MCNC: 8.8 MG/DL (ref 8.5–10.1)
CHLORIDE SERPL-SCNC: 106 MMOL/L (ref 97–108)
CO2 SERPL-SCNC: 27 MMOL/L (ref 21–32)
COMMENT, HOLDF: NORMAL
CREAT SERPL-MCNC: 3.98 MG/DL (ref 0.7–1.3)
GLUCOSE SERPL-MCNC: 88 MG/DL (ref 65–100)
PHOSPHATE SERPL-MCNC: 4.7 MG/DL (ref 2.6–4.7)
POTASSIUM SERPL-SCNC: 4.8 MMOL/L (ref 3.5–5.1)
SAMPLES BEING HELD,HOLD: NORMAL
SODIUM SERPL-SCNC: 137 MMOL/L (ref 136–145)

## 2022-07-29 PROCEDURE — 74011000250 HC RX REV CODE- 250: Performed by: STUDENT IN AN ORGANIZED HEALTH CARE EDUCATION/TRAINING PROGRAM

## 2022-07-29 PROCEDURE — 74011250637 HC RX REV CODE- 250/637: Performed by: INTERNAL MEDICINE

## 2022-07-29 PROCEDURE — 80069 RENAL FUNCTION PANEL: CPT

## 2022-07-29 PROCEDURE — 74011250636 HC RX REV CODE- 250/636: Performed by: INTERNAL MEDICINE

## 2022-07-29 PROCEDURE — 74011250637 HC RX REV CODE- 250/637: Performed by: STUDENT IN AN ORGANIZED HEALTH CARE EDUCATION/TRAINING PROGRAM

## 2022-07-29 PROCEDURE — 36415 COLL VENOUS BLD VENIPUNCTURE: CPT

## 2022-07-29 RX ORDER — TRAZODONE HYDROCHLORIDE 50 MG/1
100 TABLET ORAL
Qty: 60 TABLET | Refills: 0 | Status: SHIPPED | OUTPATIENT
Start: 2022-07-29 | End: 2022-08-28

## 2022-07-29 RX ORDER — HYDROCODONE BITARTRATE AND ACETAMINOPHEN 5; 325 MG/1; MG/1
1 TABLET ORAL
Qty: 12 TABLET | Refills: 0 | Status: SHIPPED | OUTPATIENT
Start: 2022-07-29 | End: 2022-08-01

## 2022-07-29 RX ORDER — AMLODIPINE BESYLATE 10 MG/1
10 TABLET ORAL
Qty: 30 TABLET | Refills: 0 | Status: SHIPPED | OUTPATIENT
Start: 2022-07-29 | End: 2022-08-28

## 2022-07-29 RX ORDER — CITALOPRAM 20 MG/1
20 TABLET, FILM COATED ORAL DAILY
Qty: 30 TABLET | Refills: 0 | Status: SHIPPED | OUTPATIENT
Start: 2022-07-29 | End: 2022-08-28

## 2022-07-29 RX ORDER — LABETALOL 100 MG/1
100 TABLET, FILM COATED ORAL 2 TIMES DAILY
Qty: 60 TABLET | Refills: 0 | Status: SHIPPED | OUTPATIENT
Start: 2022-07-29 | End: 2022-08-28

## 2022-07-29 RX ORDER — LABETALOL 100 MG/1
100 TABLET, FILM COATED ORAL 2 TIMES DAILY
Status: DISCONTINUED | OUTPATIENT
Start: 2022-07-29 | End: 2022-07-29 | Stop reason: HOSPADM

## 2022-07-29 RX ADMIN — SODIUM CHLORIDE 50 ML/HR: 9 INJECTION, SOLUTION INTRAVENOUS at 05:54

## 2022-07-29 RX ADMIN — CITALOPRAM HYDROBROMIDE 20 MG: 20 TABLET ORAL at 08:46

## 2022-07-29 RX ADMIN — LABETALOL HYDROCHLORIDE 100 MG: 100 TABLET, FILM COATED ORAL at 08:46

## 2022-07-29 RX ADMIN — Medication 10 ML: at 05:54

## 2022-07-29 NOTE — PROGRESS NOTES
Physician Progress Note      Kelsi GRANT  CSN #:                  [id number]  :                       1960  ADMIT DATE:       2022 6:19 PM  Jose James DATE:        2022 1:37 PM  RESPONDING  PROVIDER #:        Immanuel Hurd MD          QUERY TEXT:    Good morning  Patient admitted with JANIS. If possible, please document in progress notes and discharge summary if you are evaluating and /or treating any of the following: The medical record reflects the following:  Risk Factors: JANIS, FTT, COPD, Depression  Clinical Indicators: Malnutrition Status:  Severe malnutrition (22 1025)  Context:  Chronic illness  Findings of the 6 clinical characteristics of malnutrition:  Energy Intake:  No significant decrease in energy intake  Weight Loss:  Greater than 20% over 1 year  Body Fat Loss:  Severe body fat loss, Fat overlying ribs  Muscle Mass Loss:  Mild muscle mass loss, Hand (interosseous), Clavicles (pectoralis &deltoids)  Fluid Accumulation:  No significant fluid accumulation  Treatment:  1. Continue regular diet  2. Provide Ensure Enlive BID to increase kcal/protein intake (700 kcal, 88 g Carbs, 40 g protein)  3. Check mag and phos    Thank you  Brian Davenport RN CDI  [phone]    ASPEN Criteria:  https://aspenjournals. onlinelibrary. lucero. com/doi/full/10.1177/4036663199830360  Options provided:  -- Protein calorie malnutrition severe  -- Protein calorie malnutrition not present  -- Other - I will add my own diagnosis  -- Disagree - Not applicable / Not valid  -- Disagree - Clinically unable to determine / Unknown  -- Refer to Clinical Documentation Reviewer    PROVIDER RESPONSE TEXT:    This patient has severe protein calorie malnutrition.     Query created by: Tiago Montanez on 2022 11:55 AM      Electronically signed by:  Immanuel Hurd MD 2022 3:13 PM

## 2022-07-29 NOTE — PROGRESS NOTES
7/29/2022  Case Management Progress Note    10:23 AM  Patient is 57year old male admitted 7/25 with acute renal failure  Patient's RUR is 9% green/low risk for readmission  Covid test: none this admission  Chart reviewed  Per chart patient continuing to be followed by nephrology for recommendations. Patient does not have any noted needs for safe discharge at this time, plan remains for him to return home with family assistance when medically ready to do so. Will continue to follow and assist as needed.      Transition of Care Plan   Continue medical management/treatment  Home with family assistance when ready   Family will transport at discharge  Follow up outpatient as indicated  CM will continue to follow    CASTRO Casas

## 2022-07-29 NOTE — PROGRESS NOTES
[address] [facility] [place]  YOB: 1960          Assessment & Plan:     JANIS ? etiology improving  Subnephrotic proteinuria  Weight loss  COPD/tobacco abuse  L renal cyst by US    Rec:  No acute indication HD  Serologies negative except SPEP pending  Proteinuria could be due to nodular GS from smoking or secondary FSGS or a primary glomerular condition  Creat improving nicely now  OK for d/c from renal standpoint  Needs to follow up with us in 1-2 wk       Subjective:   CC: JANIS  HPI: Creat improving nicely. Pt feels better and wants to go home  ROS: no n/v/sob  Current Facility-Administered Medications   Medication Dose Route Frequency    labetaloL (NORMODYNE) tablet 100 mg  100 mg Oral BID    nicotine (NICODERM CQ) 14 mg/24 hr patch 1 Patch  1 Patch TransDERmal DAILY    albuterol-ipratropium (DUO-NEB) 2.5 MG-0.5 MG/3 ML  3 mL Nebulization Q6H PRN    amLODIPine (NORVASC) tablet 10 mg  10 mg Oral DAILY WITH DINNER    citalopram (CELEXA) tablet 20 mg  20 mg Oral DAILY    HYDROcodone-acetaminophen (NORCO) 5-325 mg per tablet 1 Tablet  1 Tablet Oral Q6H PRN    traZODone (DESYREL) tablet 50 mg  50 mg Oral QHS    sodium chloride (NS) flush 5-40 mL  5-40 mL IntraVENous Q8H    sodium chloride (NS) flush 5-40 mL  5-40 mL IntraVENous PRN    acetaminophen (TYLENOL) tablet 650 mg  650 mg Oral Q6H PRN    Or    acetaminophen (TYLENOL) suppository 650 mg  650 mg Rectal Q6H PRN    polyethylene glycol (MIRALAX) packet 17 g  17 g Oral DAILY PRN    ondansetron (ZOFRAN ODT) tablet 4 mg  4 mg Oral Q8H PRN    Or    ondansetron (ZOFRAN) injection 4 mg  4 mg IntraVENous Q6H PRN    0.9% sodium chloride infusion  50 mL/hr IntraVENous CONTINUOUS          Objective:     Vitals:  Blood pressure (!) 177/88, pulse 87, temperature 97.8 °F (36.6 °C), resp. rate 17, height 5' 10\" (1.778 m), weight 63.9 kg (140 lb 14 oz), SpO2 98 %.   Temp (24hrs), Av.2 °F (36.8 °C), Min:97.8 °F (36.6 °C), Max:99.4 °F (37.4 °C)      Intake and Output:  No intake/output data recorded. 07/27 1901 - 07/29 0700  In: 1625 [P.O.:125; I.V.:1500]  Out: 0     Physical Exam:               Physical Examination:   GENERAL ASSESSMENT: Thin, NAD  EXTREMITY: no EDEMA  NEURO: Grossly non focal          ECG/rhythm:    Data Review      No results for input(s): TNIPOC in the last 72 hours. No lab exists for component: ITNL   Recent Labs     07/27/22  0822          Recent Labs     07/29/22  0040 07/28/22  0115 07/27/22  0822    137 140   K 4.8 4.1 4.3    107 107   CO2 27 24 26   BUN 47* 51* 44*   CREA 3.98* 4.71* 4.94*   GLU 88 122* 146*   PHOS 4.7 5.8*  --    CA 8.8 8.6 8.6   ALB 3.1* 2.8*  --         No results for input(s): INR, PTP, APTT, INREXT, INREXT in the last 72 hours. Needs: urine analysis, urine sodium, protein and creatinine  Lab Results   Component Value Date/Time    Sodium,urine random 43 07/26/2022 07:42 AM    Creatinine, urine 56.00 07/26/2022 07:42 AM    Creatinine, urine 53.00 07/26/2022 07:42 AM           : Humza Day MD  7/29/2022        [facility]:  [email or web address]  Heike Egan office:  [address] [place] [address], [address] [facility] [address]  [place], [address] [facility]  Phone: [phone]  Fax :     [phone]    [facility] office:  [address] [facility], [address]  Phone - [phone]  Fax - [phone]

## 2022-07-29 NOTE — PROGRESS NOTES
Attempted to schedule hospital follow up with PCP   Sarah Mcneil MD [facility]. Scheduling stated patient is not able to make any appointments his account has been turned over to collections.

## 2022-07-29 NOTE — DISCHARGE SUMMARY
Physician Discharge Summary     Patient ID:  Bruce Marcum  [id number]  89 y.o.  1960    Admit date: 7/25/2022    Discharge date and time: 7/29/2022    Admission Diagnoses: Acute renal failure (ARF) ([facility] [N17.9]    Discharge Diagnoses: Active Problems:    Acute renal failure (ARF) ([facility] (7/25/2022)      Severe protein-calorie malnutrition ([facility] (7/27/2022)           Hospital Course:   Bruce Marcum is a 57 y.o. male with hx of copd, htn, mdd and insomnia who presents to ed from urgent care; referred for elevated cr. Reports generalized malaise, fatigue and poor po intake for last 3 months. Noted dark urine with decreased output over the last week. Also endorse about 45 llbs weight loss. Remarkable vitals on ER Presentations: bp 146/80  Labs Remarkable for: cr 3.69, ck 431, ua small blood, 1+ bact. He was admitted for further evaluation and treatment of the following:      #JANIS: FENa is intrinsic, and w/ hematuria, proteinuria (2.5 g/day). HCV, ANCA, C3,C4, Anti-GBM, HIV neg so far. Light chains abnl so may need to consider myeloma kidney, amyloid. Creatinine has improved so he will dc with close renal follow up. This was ultimately potentially ATN from dehydration. #Renal Mass: CT abd/pelv with 22 x 25 mm indeterminate mass. But renal ultrasound with simple cyst.        #FTT/Weight loss: BMI is 18.4. Has lost 45 lb unintentionally. Glucosuria but A1c normal. Malig still on ddx; needs outpt colo. Peripheral smear      #HTN: Resume amlodipine, stop HCTZ; started labetalol.  eventual ACEi/ARB for proteinuria     #COPD: w/o exacerbation     #Depression: Citalopram     #Tobacco use:              - Nicotine patch    PCP: Lorenda Olszewski, MD     Consults: Nephrology    Condition of patient at discharge: good and improved    Discharge Exam:    Physical Exam:    Gen: Well-developed, well-nourished, in no acute distress  HEENT:  Pink conjunctivae, PERRL, hearing intact to voice, moist mucous membranes  Neck: Supple, without masses, thyroid non-tender  Resp: No accessory muscle use, clear breath sounds without wheezes rales or rhonchi  Card: No murmurs, normal S1, S2 without thrills, bruits or peripheral edema  Abd:  Soft, non-tender, non-distended, normoactive bowel sounds are present, no palpable organomegaly and no detectable hernias  Lymph:  No cervical or inguinal adenopathy  Musc: No cyanosis or clubbing  Skin: No rashes or ulcers, skin turgor is good  Neuro:  Cranial nerves are grossly intact, no focal motor weakness, follows commands appropriately  Psych:  Good insight, oriented to person, place and time, alert          Disposition: home    Patient Instructions:   Current Discharge Medication List        START taking these medications    Details   labetaloL (NORMODYNE) 100 mg tablet Take 1 Tablet by mouth two (2) times a day for 30 days. Qty: 60 Tablet, Refills: 0           CONTINUE these medications which have CHANGED    Details   amLODIPine (NORVASC) 10 mg tablet Take 1 Tablet by mouth daily (with dinner) for 30 days. Takes at 1600 daily. Qty: 30 Tablet, Refills: 0      traZODone (DESYREL) 50 mg tablet Take 2 Tablets by mouth nightly for 30 days. Qty: 60 Tablet, Refills: 0      HYDROcodone-acetaminophen (NORCO) 5-325 mg per tablet Take 1 Tablet by mouth every six (6) hours as needed for Pain for up to 3 days. Max Daily Amount: 4 Tablets. Qty: 12 Tablet, Refills: 0    Associated Diagnoses: Acute renal failure, unspecified acute renal failure type (HCC)      citalopram (CELEXA) 20 mg tablet Take 1 Tablet by mouth in the morning for 30 days. Qty: 30 Tablet, Refills: 0           CONTINUE these medications which have NOT CHANGED    Details   albuterol (PROVENTIL HFA, VENTOLIN HFA, PROAIR HFA) 90 mcg/actuation inhaler Take 2 Puffs by inhalation every six (6) hours as needed for Wheezing.            STOP taking these medications       hydroCHLOROthiazide (MICROZIDE) 12.5 mg capsule Comments: Reason for Stopping:         acetaminophen-codeine (TYLENOL #3) 300-30 mg per tablet Comments:   Reason for Stopping:             Activity: Activity as tolerated  Diet: Regular Diet  Wound Care: None needed    Follow-up with Miroslava Can MD in 1 week. Follow-up tests/labs BMP    Approximate time spent in patient care on day of discharge: 35 min    Signed:   Erin Hamlin MD  7/29/2022  11:18 AM

## 2022-07-29 NOTE — PROGRESS NOTES
Problem: General Medical Care Plan  Goal: *Absence of infection signs and symptoms  Outcome: Progressing Towards Goal  Goal: *Optimize nutritional status  Outcome: Progressing Towards Goal     Problem: Falls - Risk of  Goal: *Absence of Falls  Description: Document Charan Fall Risk and appropriate interventions in the flowsheet.   Outcome: Progressing Towards Goal  Note: Fall Risk Interventions:            Medication Interventions: Teach patient to arise slowly    Elimination Interventions: Call light in reach              Problem: Patient Education: Go to Patient Education Activity  Goal: Patient/Family Education  Outcome: Progressing Towards Goal     Problem: Nutrition Deficit  Goal: *Optimize nutritional status  Outcome: Progressing Towards Goal

## 2022-07-29 NOTE — ROUTINE PROCESS
Pt discharged per order. Written and verbal discharge orders given to pt. Pt educated on medications, importance of follow up, and when to return to ED. New prescriptions e-scribed to [facility]. IV catheter removed intact and without redness or edema. Pt to vehicle via ambulatory per request. No distress noted and all personal belongings at side.

## 2022-08-01 LAB
IGA SERPL-MCNC: 136 MG/DL (ref 61–437)
IGG SERPL-MCNC: 641 MG/DL (ref 603–1613)
IGM SERPL-MCNC: 55 MG/DL (ref 20–172)
PROT PATTERN SERPL IFE-IMP: NORMAL

## 2022-12-19 ENCOUNTER — OFFICE VISIT (OUTPATIENT)
Dept: NEUROLOGY | Age: 62
End: 2022-12-19
Payer: COMMERCIAL

## 2022-12-19 VITALS
SYSTOLIC BLOOD PRESSURE: 120 MMHG | OXYGEN SATURATION: 96 % | HEIGHT: 70 IN | HEART RATE: 82 BPM | WEIGHT: 134.5 LBS | RESPIRATION RATE: 18 BRPM | BODY MASS INDEX: 19.26 KG/M2 | DIASTOLIC BLOOD PRESSURE: 66 MMHG

## 2022-12-19 DIAGNOSIS — G62.1 ALCOHOLIC PERIPHERAL NEUROPATHY (HCC): ICD-10-CM

## 2022-12-19 DIAGNOSIS — G25.0 BENIGN ESSENTIAL TREMOR: Primary | ICD-10-CM

## 2022-12-19 DIAGNOSIS — F10.20 CEREBELLAR ATAXIA DUE TO ALCOHOLISM (HCC): ICD-10-CM

## 2022-12-19 DIAGNOSIS — G31.2 CEREBELLAR ATAXIA DUE TO ALCOHOLISM (HCC): ICD-10-CM

## 2022-12-19 PROCEDURE — 99204 OFFICE O/P NEW MOD 45 MIN: CPT | Performed by: PSYCHIATRY & NEUROLOGY

## 2022-12-19 RX ORDER — TRAZODONE HYDROCHLORIDE 50 MG/1
100 TABLET ORAL
COMMUNITY
Start: 2022-01-05

## 2022-12-19 RX ORDER — LABETALOL 100 MG/1
100 TABLET, FILM COATED ORAL 2 TIMES DAILY
COMMUNITY
Start: 2022-12-09

## 2022-12-19 RX ORDER — AMLODIPINE BESYLATE 10 MG/1
10 TABLET ORAL DAILY
COMMUNITY
Start: 2022-11-23

## 2022-12-19 RX ORDER — CITALOPRAM 40 MG/1
40 TABLET, FILM COATED ORAL DAILY
COMMUNITY
Start: 2022-12-08

## 2022-12-19 NOTE — PROGRESS NOTES
Chief Complaint   Patient presents with    New Patient     Patient referred by PCP, Dr. Twin De Santiago for tremors for about 1 year in both hands and legs sometimes in legs. Patient reports tremors are more in hands than legs.        Visit Vitals  /66   Pulse 82   Resp 18   Ht 5' 10\" (1.778 m)   Wt 61 kg (134 lb 8 oz)   SpO2 96%   BMI 19.30 kg/m²

## 2022-12-19 NOTE — PROGRESS NOTES
NEUROLOGY CLINIC NOTE    Patient ID:  Andreia Cardona  [id number]  58 y.o.  1960    Date of Consultation:  December 19, 2022    Referring Physician: Willem Bashir NP    Reason for Consultation:  tremors    Chief Complaint   Patient presents with    New Patient     Patient referred by PCP, Dr. Willem Bashir for tremors for about 1 year in both hands and legs sometimes in legs. Patient reports tremors are more in hands than legs. History of Present Illness:     Patient Active Problem List    Diagnosis Date Noted    Severe protein-calorie malnutrition ([facility] 07/27/2022    Acute renal failure (ARF) ([facility] 07/25/2022     Past Medical History:   Diagnosis Date    Chronic obstructive pulmonary disease ([facility]     per xray report 3/24/16. PCP Dr. Kelin Rush    Chronic pain     tamera feet, tamera knees    Hypertension       Past Surgical History:   Procedure Laterality Date    HX ORTHOPAEDIC      pins in left hip due to growth delay at age 12    HX [de-identified]      HX TONSILLECTOMY      HX UROLOGICAL      testicular surgery to \"clean a gland out\", 30 years ago    GA WRIST ARTHROSCOP,CLEAN/DRAIN      traumatic wrist injury, tendons were repaired      Prior to Admission medications    Medication Sig Start Date End Date Taking? Authorizing Provider   labetaloL (NORMODYNE) 100 mg tablet Take 100 mg by mouth two (2) times a day. 12/9/22  Yes Provider, Historical   traZODone (DESYREL) 50 mg tablet Take 100 mg by mouth nightly. 1/5/22  Yes Provider, Historical   citalopram (CELEXA) 40 mg tablet Take 40 mg by mouth daily. 12/8/22  Yes Provider, Historical   amLODIPine (NORVASC) 10 mg tablet Take 10 mg by mouth daily. 11/23/22  Yes Provider, Historical   albuterol (PROVENTIL HFA, VENTOLIN HFA, PROAIR HFA) 90 mcg/actuation inhaler Take 2 Puffs by inhalation every six (6) hours as needed for Wheezing.    Yes Provider, Historical     Allergies   Allergen Reactions    Adhesive Tape-Silicones Rash     Please use cloth tape        Social History     Tobacco Use    Smoking status: Every Day     Packs/day: 0.50     Types: Cigarettes    Smokeless tobacco: Never   Substance Use Topics    Alcohol use: Not Currently      Family History   Problem Relation Age of Onset    Heart Disease Mother     Hypertension Mother     Cancer Father         colon        Subjective:      Stephane Headley is a 57 y.o. RHWM with history of chronic alcohol use, COPD, hypertension, kidney issues and nutrition issues who was referred here by Jeff Raines NP for further evaluation of his tremors. Per patient condition started gradually about 1 year prior to consult. Intermittent in nature. However it has increased in frequency and seems to be elevated more constant. Mainly affecting both hands. Not prominent at rest but more with use of the hand and when holding onto things. He also notices some episodes of leg tremors. Denies any tightness or muscle spasm. Denies any sense of weakness. Tremors currently does not affect his routine activities of daily living. Patient also reports issues of wobbliness and gait instability. Denies any falls. Patient also having issues with weight loss. Patient admits to chronic alcohol use and has recently been sober since his hospitalization last July 2022. Laboratory work-up done while in the hospital last July 2022 revealed nonreactive HIV testing. Unremarkable CPK, glomerular basement membrane antibody, hepatitis B, immunoelectrophoresis, antineutrophil cytoplasmic antibody, EMERALD, C3/C4 complement, hemoglobin A1c, PTH. Elevated free kappa and lambda light chains and kappa/lambda ratio    CT of the abdomen and pelvis done 7/25/2022 revealed no acute process. Moderate emphysema. Indeterminant left renal mass (22 x 25 mm). Chest CT without contrast revealed mild emphysema. Renal ultrasound revealed no hydronephrosis. Bilateral echogenic kidneys consistent with medical renal disease.     Outside reports reviewed: radiology reports, lab reports, historical medical records. Review of Systems:    A comprehensive review of systems was performed:   Constitutional: positive for weight loss, poor appetite, fatigue  Eyes: positive for vision problems  Ears, nose, mouth, throat, and face: positive for hearing problems  Respiratory: positive for none  Cardiovascular: positive for high blood pressure  Gastrointestinal: positive for none  Genitourinary: positive for none  Integument/breast: positive for none  Hematologic/lymphatic: positive for none  Musculoskeletal: positive for weakness  Neurological: positive for memory loss, tremor  Behavioral/Psych: positive for anxiety, depression  Endocrine: positive for none  Allergic/Immunologic: positive for none    Objective:     Visit Vitals  /66   Pulse 82   Resp 18   Ht 5' 10\" (1.778 m)   Wt 61 kg (134 lb 8 oz)   SpO2 96%   BMI 19.30 kg/m²       PHYSICAL EXAM:    General Appearance: Alert, patient appears stated age. General:  Thin, patient in no apparent distress. Head/Face: The head is normocephalic and atraumatic. Eyes: Conjunctivae appear normal. Sclera appear normal and non-icteric. Nose (and Sinus):   No abnormality of the nose or sinuses is noted. Oral:   Throat is clear. Lymphatics:  No lymphadenopathy in the neck/head. Neck and Thyroid:   No bruits noted in the neck. Respiratory:  Lungs clear to auscultation. Cardiovascular:  Palpation and auscultation: regular rate and rhythm. Extremity: No joint swelling, erythema or pedal edema. R>L hammer toeing     NEUROLOGICAL EXAM:    Appearance: The patient is well developed, well nourished, provides a coherent history and is in no acute distress. Mental Status: Oriented to time, place and person. Fluent, no aphasia or dysarthria. Mood and affect appropriate. Cranial Nerves:   Intact visual fields. JENISE, EOM's full, no nystagmus, no ptosis. Facial sensation is normal. Corneal reflexes are intact.  Facial movement is symmetric. Hearing is normal bilaterally. Palate is midline with normal elevation. Sternocleidomastoid and trapezius muscles are normal. Tongue is midline. Motor:  5/5 strength except feet intrinsic muscle weakness and atrophy. Normal tone. No fasciculations. No pronator drift. Reflexes:   Deep tendon reflexes 1+/4 UE, trace knees and 0 ankle. Equivocal toes. Sensory:   Stocking sensory deficit (distal lower leg to feet) except PP. Gait:  Wide based on unsteady. (+) Romberg. Significant truncal instability. Problems with tandem walking. Tremor:   Intentional>postural UE tremor noted. No resting pill rolling tremor   Cerebellar:  Intact FTN/TAB/HTS. no bradykinesia   Neurovascular:  Normal heart sounds and regular rhythm, peripheral pulses intact, and no carotid bruits. 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 No resting pill tremor seen. No findings typically seen in Parkinson's disease. Head CT was ordered to further assess. At most issues consistent with benign essential tremors or tremor secondary to cerebellar issues due to alcoholism. Discussed with the patient findings and implication. Since tremors currently not affecting his routine activities of daily living then there is no indication to treat. However if condition worsens then potential trial of medications for tremors including primidone, beta-blockers, gabapentin, topiramate or as needed benzodiazepines. Will monitor for now. Encouraged to continue to abstain from any form of alcohol. Patient is showing signs and examination of cerebellar ataxia likely due to peripheral field shrinkage from chronic alcohol use. Head CT was ordered to further assess. Again advised to abstain from any use of alcohol. 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 Exam reveals distal lower extremity sensorimotor polyneuropathy in this case secondary to chronic alcohol use. It is causing significant issues with gait stability and risk for falling. This issue will cause problems for patient in doing labor that preferentially includes prolonged walking or standing. May do well with sedentary labor of office work or just sitting down. Advised to discuss with primary care physician in relation to potential filing for disability in relation to his ongoing medical issues as well as presence of his polyneuropathy. He understood. All questions and concerns were answered. This note was created using voice recognition software. Despite editing, there may be syntax errors.

## 2022-12-19 NOTE — LETTER
12/19/2022    Patient: Andreia Cardona   YOB: 1960   Date of Visit: 12/19/2022     Jose Yarbrough MD  [address] [facility]  [address] [place] [address]  Via Fax: [phone]    Dear Jose Yarbrough MD,      Thank you for referring Mr. Barry Hawkins to [facility] for evaluation. My notes for this consultation are attached. If you have questions, please do not hesitate to call me. I look forward to following your patient along with you.       Sincerely,    Roseanne Duke MD

## 2022-12-29 ENCOUNTER — HOSPITAL ENCOUNTER (OUTPATIENT)
Dept: CT IMAGING | Age: 62
Discharge: HOME | End: 2022-12-29
Attending: PSYCHIATRY & NEUROLOGY
Payer: COMMERCIAL

## 2022-12-29 DIAGNOSIS — G25.0 BENIGN ESSENTIAL TREMOR: ICD-10-CM

## 2022-12-29 DIAGNOSIS — F10.20 CEREBELLAR ATAXIA DUE TO ALCOHOLISM (HCC): ICD-10-CM

## 2022-12-29 DIAGNOSIS — G31.2 CEREBELLAR ATAXIA DUE TO ALCOHOLISM (HCC): ICD-10-CM

## 2022-12-29 PROCEDURE — 70450 CT HEAD/BRAIN W/O DYE: CPT

## 2023-02-01 ENCOUNTER — OFFICE VISIT (OUTPATIENT)
Dept: NEUROLOGY | Age: 63
End: 2023-02-01
Payer: COMMERCIAL

## 2023-02-01 VITALS
SYSTOLIC BLOOD PRESSURE: 112 MMHG | OXYGEN SATURATION: 98 % | HEART RATE: 68 BPM | DIASTOLIC BLOOD PRESSURE: 68 MMHG | RESPIRATION RATE: 20 BRPM

## 2023-02-01 DIAGNOSIS — G31.2 CEREBELLAR ATAXIA DUE TO ALCOHOLISM (HCC): ICD-10-CM

## 2023-02-01 DIAGNOSIS — F10.20 CEREBELLAR ATAXIA DUE TO ALCOHOLISM (HCC): ICD-10-CM

## 2023-02-01 DIAGNOSIS — G62.1 ALCOHOLIC PERIPHERAL NEUROPATHY (HCC): ICD-10-CM

## 2023-02-01 DIAGNOSIS — G25.0 BENIGN ESSENTIAL TREMOR: ICD-10-CM

## 2023-02-01 DIAGNOSIS — Z86.73 HISTORY OF CVA (CEREBROVASCULAR ACCIDENT): Primary | ICD-10-CM

## 2023-02-01 PROCEDURE — 99214 OFFICE O/P EST MOD 30 MIN: CPT

## 2023-02-01 RX ORDER — GABAPENTIN 100 MG/1
100 CAPSULE ORAL 3 TIMES DAILY
COMMUNITY
Start: 2022-12-29

## 2023-02-01 NOTE — PROGRESS NOTES
Phlyicia Hall is a 57 y.o. male who presents with the following  Chief Complaint   Patient presents with    Follow-up     Results        HPI  Patient and his wife are here today for CT scan results follow-up. Patient was seen December 19, 2022 by Dr. Carolann Bhatia for tremor, gait disturbance, and polyneuropathy of the lower extremities. Dr. Carolann Bhatia ordered a CT scan of the brain due to cerebellar ataxia signs on examination. CT scan showed: The ventricles and sulci are normal in size, shape and configuration. . There is mild periventricular white matter hypodensity. A discrete lacunar infarct is present in the right external capsule and the anterior limb of the left internal capsule. Mild bifrontal and bitemporal volume loss is seen. . There is no intracranial hemorrhage, extra-axial collection, or mass effect. The basilar cisterns are open. No CT evidence of acute infarct. The left vertebral artery calcification is seen. The bone windows demonstrate no abnormalities. The visualized portions of the paranasal sinuses and mastoid air cells are clear. IMPRESSION  No acute intracranial process seen. Underlying white matter disease. Discussed with the patient that there is a chronic infarct present which most likely has no bearing on his issues today. Patient states that he likely had this infarct about 10 years ago when he was using cocaine and drinking alcohol. Discussed white matter disease and how it occurs and presents itself. Patient states that he does have some memory loss. Offered to refer the patient to physical therapy for balance and coordination however the patient declined. Allergies   Allergen Reactions    Adhesive Tape-Silicones Rash     Please use cloth tape         Current Outpatient Medications   Medication Sig    gabapentin (NEURONTIN) 100 mg capsule Take 100 mg by mouth three (3) times daily. labetaloL (NORMODYNE) 100 mg tablet Take 100 mg by mouth two (2) times a day. traZODone (DESYREL) 50 mg tablet Take 100 mg by mouth nightly. citalopram (CELEXA) 40 mg tablet Take 40 mg by mouth daily. amLODIPine (NORVASC) 10 mg tablet Take 10 mg by mouth daily. albuterol (PROVENTIL HFA, VENTOLIN HFA, PROAIR HFA) 90 mcg/actuation inhaler Take 2 Puffs by inhalation every six (6) hours as needed for Wheezing. No current facility-administered medications for this visit. Social History     Tobacco Use   Smoking Status Every Day    Packs/day: 0.50    Types: Cigarettes   Smokeless Tobacco Never       Past Medical History:   Diagnosis Date    Chronic obstructive pulmonary disease ([facility] [address]     per xray report 3/24/16. PCP Dr. Alysa Perez    Chronic pain     tamera feet, tamera knees    Hypertension        Past Surgical History:   Procedure Laterality Date    HX ORTHOPAEDIC      pins in left hip due to growth delay at age 12    HX [de-identified]      HX TONSILLECTOMY      HX UROLOGICAL      testicular surgery to \"clean a gland out\", 30 years ago    AK ARTHROSCOPY WRIST INFECTION LAVAGE&DRAINAGE      traumatic wrist injury, tendons were repaired       Family History   Problem Relation Age of Onset    Heart Disease Mother     Hypertension Mother     Cancer Father         colon       Social History     Socioeconomic History    Marital status:    Tobacco Use    Smoking status: Every Day     Packs/day: 0.50     Types: Cigarettes    Smokeless tobacco: Never   Vaping Use    Vaping Use: Never used   Substance and Sexual Activity    Alcohol use: Not Currently    Drug use: Yes     Types: Marijuana     Comment: edible every 2 weeks--Past HX:  cocaine    Sexual activity: Yes     Partners: Female       Review of Systems   Constitutional:  Positive for weight loss. Musculoskeletal:  Negative for falls. Neurological:  Positive for sensory change and weakness. Psychiatric/Behavioral:  Positive for memory loss and substance abuse.          History of alcoholism and drug use      Remainder of comprehensive review is negative. Physical Exam :    Visit Vitals  /68 (BP 1 Location: Left upper arm, BP Patient Position: Sitting, BP Cuff Size: Adult)   Pulse 68   Resp 20   SpO2 98%       General: Well defined, nourished, and groomed individual in no acute distress. Neck: Not assessed   Musculoskeletal: Not assessed   Psych: Good mood and bright affect    NEUROLOGICAL EXAMINATION:    Mental Status: Alert and oriented to person, place, and time    Cranial Nerves:    II, III, IV, VI: Visual acuity grossly intact. Visual fields not assessed   Pupils Not assessed   Extra-ocular movements are full and fluid. V-XII: Hearing is grossly intact. Facial features are symmetric, with normal sensation and strength. The palate rises symmetrically and the tongue protrudes midline. Motor Examination:Not assessed   Coordination: Not assessed   Gait and Station: Not assessed     Reflexes: Not assessed     Neurosensory Exam:  Not assessed     Results for orders placed or performed during the hospital encounter of 07/25/22   URINE CULTURE HOLD SAMPLE    Specimen: Serum   Result Value Ref Range    Urine culture hold        Urine on hold in Microbiology dept for 2 days. If unpreserved urine is submitted, it cannot be used for addtional testing after 24 hours, recollection will be required. CULTURE, URINE    Specimen: Clean catch; Urine   Result Value Ref Range    Special Requests: NO SPECIAL REQUESTS      Culture result: No growth (<1,000 CFU/ML)     URINE CULTURE HOLD SAMPLE    Specimen: Serum   Result Value Ref Range    Urine culture hold        Urine on hold in Microbiology dept for 2 days. If unpreserved urine is submitted, it cannot be used for addtional testing after 24 hours, recollection will be required.    SAMPLES BEING HELD   Result Value Ref Range    SAMPLES BEING HELD 1DK GRN,1RED,1SST,1BLUE     COMMENT        Add-on orders for these samples will be processed based on acceptable specimen integrity and analyte stability, which may vary by analyte. CBC WITH AUTOMATED DIFF   Result Value Ref Range    WBC 8.2 4.1 - 11.1 K/uL    RBC 4.88 4.10 - 5.70 M/uL    HGB 14.4 12.1 - 17.0 g/dL    HCT 42.9 36.6 - 50.3 %    MCV 87.9 80.0 - 99.0 FL    MCH 29.5 26.0 - 34.0 PG    MCHC 33.6 30.0 - 36.5 g/dL    RDW 14.6 (H) 11.5 - 14.5 %    PLATELET 464 547 - 032 K/uL    MPV 8.9 8.9 - 12.9 FL    NRBC 0.0 0  WBC    ABSOLUTE NRBC 0.00 0.00 - 0.01 K/uL    NEUTROPHILS 77 (H) 32 - 75 %    LYMPHOCYTES 11 (L) 12 - 49 %    MONOCYTES 10 5 - 13 %    EOSINOPHILS 1 0 - 7 %    BASOPHILS 1 0 - 1 %    IMMATURE GRANULOCYTES 0 0.0 - 0.5 %    ABS. NEUTROPHILS 6.4 1.8 - 8.0 K/UL    ABS. LYMPHOCYTES 0.9 0.8 - 3.5 K/UL    ABS. MONOCYTES 0.8 0.0 - 1.0 K/UL    ABS. EOSINOPHILS 0.1 0.0 - 0.4 K/UL    ABS. BASOPHILS 0.1 0.0 - 0.1 K/UL    ABS. IMM. GRANS. 0.0 0.00 - 0.04 K/UL    DF AUTOMATED     METABOLIC PANEL, COMPREHENSIVE   Result Value Ref Range    Sodium 135 (L) 136 - 145 mmol/L    Potassium 3.6 3.5 - 5.1 mmol/L    Chloride 101 97 - 108 mmol/L    CO2 28 21 - 32 mmol/L    Anion gap 6 5 - 15 mmol/L    Glucose 136 (H) 65 - 100 mg/dL    BUN 29 (H) 6 - 20 MG/DL    Creatinine 3.69 (H) 0.70 - 1.30 MG/DL    BUN/Creatinine ratio 8 (L) 12 - 20      GFR est AA 20 (L) >60 ml/min/1.73m2    GFR est non-AA 17 (L) >60 ml/min/1.73m2    Calcium 9.1 8.5 - 10.1 MG/DL    Bilirubin, total 0.3 0.2 - 1.0 MG/DL    ALT (SGPT) 24 12 - 78 U/L    AST (SGOT) 29 15 - 37 U/L    Alk.  phosphatase 47 45 - 117 U/L    Protein, total 7.1 6.4 - 8.2 g/dL    Albumin 3.8 3.5 - 5.0 g/dL    Globulin 3.3 2.0 - 4.0 g/dL    A-G Ratio 1.2 1.1 - 2.2     URINALYSIS W/MICROSCOPIC   Result Value Ref Range    Color YELLOW/STRAW      Appearance CLOUDY (A) CLEAR      Specific gravity 1.013 1.003 - 1.030      pH (UA) 7.0 5.0 - 8.0      Protein >300 (A) NEG mg/dL    Glucose 100 (A) NEG mg/dL    Ketone Negative NEG mg/dL    Bilirubin Negative NEG      Blood SMALL (A) NEG      Urobilinogen 0.2 0.2 - 1.0 EU/dL Nitrites Negative NEG      Leukocyte Esterase Negative NEG      WBC 0-4 0 - 4 /hpf    RBC 5-10 0 - 5 /hpf    Epithelial cells FEW FEW /lpf    Bacteria 1+ (A) NEG /hpf   CK   Result Value Ref Range     (H) 39 - 571 U/L   METABOLIC PANEL, BASIC   Result Value Ref Range    Sodium 136 136 - 145 mmol/L    Potassium 3.7 3.5 - 5.1 mmol/L    Chloride 104 97 - 108 mmol/L    CO2 24 21 - 32 mmol/L    Anion gap 8 5 - 15 mmol/L    Glucose 120 (H) 65 - 100 mg/dL    BUN 37 (H) 6 - 20 MG/DL    Creatinine 4.29 (H) 0.70 - 1.30 MG/DL    BUN/Creatinine ratio 9 (L) 12 - 20      GFR est AA 17 (L) >60 ml/min/1.73m2    GFR est non-AA 14 (L) >60 ml/min/1.73m2    Calcium 8.6 8.5 - 10.1 MG/DL   SODIUM, UR, RANDOM   Result Value Ref Range    Sodium,urine random 43 MMOL/L   CREATININE, UR, RANDOM   Result Value Ref Range    Creatinine, urine random 56.00 mg/dL   PROTEIN/CREATININE RATIO, URINE   Result Value Ref Range    Protein, urine random 135 (H) 0.0 - 11.9 mg/dL    Creatinine, urine random 53.00 mg/dL    Protein/Creat.  urine Ratio 2.5     PTH INTACT   Result Value Ref Range    Calcium 9.0 8.5 - 10.1 MG/DL    PTH, Intact 77.3 18.4 - 88.0 pg/mL   HEMOGLOBIN A1C WITH EAG   Result Value Ref Range    Hemoglobin A1c 5.2 4.0 - 5.6 %    Est. average glucose 103 mg/dL   COMPLEMENT, C3   Result Value Ref Range    Complement C3 99 82 - 167 mg/dL   COMPLEMENT, C4   Result Value Ref Range    Complement C4 18 12 - 38 mg/dL   EMERALD, DIRECT, W/REFLEX   Result Value Ref Range    EMERALD, Direct Negative Negative     ANTI-NEUTROPHIL CYTOPLASMIC AB   Result Value Ref Range    Cytoplasmic (C-ANCA) Ab <1:20 Neg:<1:20 titer    Perinuclear (P-ANCA) <1:20 Neg:<1:20 titer    Atypical pANCA <1:20 Neg:<1:20 titer   IMMUNOELECTROPHORESIS [facility].)   Result Value Ref Range    Immunofixation, serum Comment      Immunoglobulin G, Qt. 641 603 - 1,613 mg/dL    Immunoglobulin A, Qt. 136 61 - 437 mg/dL    Immunoglobulin M, Qt. 55 20 - 172 mg/dL   FREE LIGHT CHAINS, KAPPA/LAMBDA, QT   Result Value Ref Range    Free Kappa Lt Chains, serum 59.7 (H) 3.3 - 19.4 mg/L    Free Lambda Lt Chains, serum 31.7 (H) 5.7 - 26.3 mg/L    Kappa/Lambda ratio, serum 1.88 (H) 0.26 - 1.65     HEPATITIS C AB   Result Value Ref Range    Hep C virus Ab Interp. NONREACTIVE NR     HEP B SURFACE AG   Result Value Ref Range    Hepatitis B surface Ag <0.10 Index    Hep B surface Ag Interp. Negative NEG     GLOMERULAR BASEMENT MEMBRANE AB   Result Value Ref Range    Glomerular Basement Membrane Ab <0.2 0.0 - 0.9 units   METABOLIC PANEL, BASIC   Result Value Ref Range    Sodium 140 136 - 145 mmol/L    Potassium 4.3 3.5 - 5.1 mmol/L    Chloride 107 97 - 108 mmol/L    CO2 26 21 - 32 mmol/L    Anion gap 7 5 - 15 mmol/L    Glucose 146 (H) 65 - 100 mg/dL    BUN 44 (H) 6 - 20 MG/DL    Creatinine 4.94 (H) 0.70 - 1.30 MG/DL    BUN/Creatinine ratio 9 (L) 12 - 20      GFR est AA 14 (L) >60 ml/min/1.73m2    GFR est non-AA 12 (L) >60 ml/min/1.73m2    Calcium 8.6 8.5 - 10.1 MG/DL   HIV 1/2 AG/AB, 4TH GENERATION,W RFLX CONFIRM   Result Value Ref Range    HIV 1/2 Interpretation NONREACTIVE NR      HIV 1/2 result comment SEE NOTE     CK   Result Value Ref Range     39 - 308 U/L   RENAL FUNCTION PANEL   Result Value Ref Range    Sodium 137 136 - 145 mmol/L    Potassium 4.1 3.5 - 5.1 mmol/L    Chloride 107 97 - 108 mmol/L    CO2 24 21 - 32 mmol/L    Anion gap 6 5 - 15 mmol/L    Glucose 122 (H) 65 - 100 mg/dL    BUN 51 (H) 6 - 20 MG/DL    Creatinine 4.71 (H) 0.70 - 1.30 MG/DL    BUN/Creatinine ratio 11 (L) 12 - 20      GFR est AA 15 (L) >60 ml/min/1.73m2    GFR est non-AA 13 (L) >60 ml/min/1.73m2    Calcium 8.6 8.5 - 10.1 MG/DL    Phosphorus 5.8 (H) 2.6 - 4.7 MG/DL    Albumin 2.8 (L) 3.5 - 5.0 g/dL   PERIPHERAL SMEAR   Result Value Ref Range    PERIPHERAL SMEAR        Pathologic examination results can be viewed in [facility] Care Chart Review under the Pathology tab.    RENAL FUNCTION PANEL   Result Value Ref Range    Sodium 137 136 - 145 mmol/L    Potassium 4.8 3.5 - 5.1 mmol/L    Chloride 106 97 - 108 mmol/L    CO2 27 21 - 32 mmol/L    Anion gap 4 (L) 5 - 15 mmol/L    Glucose 88 65 - 100 mg/dL    BUN 47 (H) 6 - 20 MG/DL    Creatinine 3.98 (H) 0.70 - 1.30 MG/DL    BUN/Creatinine ratio 12 12 - 20      GFR est AA 19 (L) >60 ml/min/1.73m2    GFR est non-AA 15 (L) >60 ml/min/1.73m2    Calcium 8.8 8.5 - 10.1 MG/DL    Phosphorus 4.7 2.6 - 4.7 MG/DL    Albumin 3.1 (L) 3.5 - 5.0 g/dL   SAMPLES BEING HELD   Result Value Ref Range    SAMPLES BEING HELD 1LAV     COMMENT        Add-on orders for these samples will be processed based on acceptable specimen integrity and analyte stability, which may vary by analyte. Orders Placed This Encounter    gabapentin (NEURONTIN) 100 mg capsule     Sig: Take 100 mg by mouth three (3) times daily. 1. History of CVA (cerebrovascular accident)    2. 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 Alcoholic peripheral neuropathy ([address]    4. Benign essential tremor      Spoke with Dr. Isela Andrade about the patient's CT scan and he advised that he did not believe that this chronic stroke had any bearing on the patient's current neurological problems. He felt that they were likely all related to his alcohol and drug abuse. Offered a referral for physical therapy for balance and coordination however the patient declined. Patient will follow-up with Dr. Radha Suarez in April as planned.       This note will not be viewable in [employer]

## 2023-02-01 NOTE — PROGRESS NOTES
Has been using the gabapentin 1 tablet three times a day- has only been on it for about 1 month has tolerated the medication well so far   Shakes still   Hands cramp up really bad to the point they look deformed   Has decreased the amount of sodas and increasing the amount of water     Has had one fall since his last visit

## 2023-04-25 ENCOUNTER — OFFICE VISIT (OUTPATIENT)
Dept: NEUROLOGY | Age: 63
End: 2023-04-25
Payer: COMMERCIAL

## 2023-04-25 VITALS
RESPIRATION RATE: 18 BRPM | HEIGHT: 70 IN | WEIGHT: 136.4 LBS | DIASTOLIC BLOOD PRESSURE: 78 MMHG | TEMPERATURE: 97.3 F | HEART RATE: 88 BPM | BODY MASS INDEX: 19.53 KG/M2 | SYSTOLIC BLOOD PRESSURE: 130 MMHG | OXYGEN SATURATION: 98 %

## 2023-04-25 DIAGNOSIS — D89.2 ELEVATED IMMUNE PROTEIN IN BLOOD: ICD-10-CM

## 2023-04-25 DIAGNOSIS — F10.20 CEREBELLAR ATAXIA DUE TO ALCOHOLISM (HCC): ICD-10-CM

## 2023-04-25 DIAGNOSIS — G31.2 CEREBELLAR ATAXIA DUE TO ALCOHOLISM (HCC): ICD-10-CM

## 2023-04-25 DIAGNOSIS — Z86.73 HISTORY OF CVA (CEREBROVASCULAR ACCIDENT): ICD-10-CM

## 2023-04-25 DIAGNOSIS — R41.3 MEMORY LOSS: Primary | ICD-10-CM

## 2023-04-25 DIAGNOSIS — G62.1 ALCOHOLIC PERIPHERAL NEUROPATHY (HCC): ICD-10-CM

## 2023-04-25 DIAGNOSIS — G25.0 BENIGN ESSENTIAL TREMOR: ICD-10-CM

## 2023-04-25 PROCEDURE — 99214 OFFICE O/P EST MOD 30 MIN: CPT | Performed by: PSYCHIATRY & NEUROLOGY

## 2023-04-25 RX ORDER — GABAPENTIN 600 MG/1
600 TABLET ORAL 3 TIMES DAILY
COMMUNITY

## 2023-04-25 NOTE — PROGRESS NOTES
Chief Complaint   Patient presents with    Tremors     4 month follow up- patient reports tremors are about the same and haven't gotten any worse. Memory Loss     Patient states has been having short term memory issues for about 6 months and long term memory issues for about 1 year and seems like short term memory is getting worse.          Visit Vitals  /78   Pulse 88   Temp 97.3 °F (36.3 °C) (Oral)   Resp 18   Ht 5' 10\" (1.778 m)   Wt 61.9 kg (136 lb 6.4 oz)   SpO2 98%   BMI 19.57 kg/m²

## 2023-04-25 NOTE — PROGRESS NOTES
NEUROLOGY CLINIC NOTE    Patient ID:  Wilner Valdez  [id number]  82 y.o.  1960    Date of Visit:  April 25, 2023    Referring Physician: Teodoro Mcclure NP    Reason for Visit:  tremors, memory loss    Chief Complaint   Patient presents with    Tremors     4 month follow up- patient reports tremors are about the same and haven't gotten any worse. Memory Loss     Patient states has been having short term memory issues for about 6 months and long term memory issues for about 1 year and seems like short term memory is getting worse. History of Present Illness:     Patient Active Problem List    Diagnosis Date Noted    Severe protein-calorie malnutrition ([facility] [address] 07/27/2022    Acute renal failure (ARF) ([facility] [address] 07/25/2022     Past Medical History:   Diagnosis Date    Chronic obstructive pulmonary disease ([facility] [address]     per xray report 3/24/16. PCP Dr. Samuel Howard    Chronic pain     tamera feet, tamera knees    Hypertension       Past Surgical History:   Procedure Laterality Date    HX ORTHOPAEDIC      pins in left hip due to growth delay at age 12    HX [de-identified]      HX TONSILLECTOMY      HX UROLOGICAL      testicular surgery to \"clean a gland out\", 30 years ago    [place] ARTHROSCOPY WRIST INFECTION LAVAGE&DRAINAGE      traumatic wrist injury, tendons were repaired      Prior to Admission medications    Medication Sig Start Date End Date Taking? Authorizing Provider   gabapentin (NEURONTIN) 600 mg tablet Take 1 Tablet by mouth three (3) times daily. Max Daily Amount: 1,800 mg. Yes Provider, Historical   labetaloL (NORMODYNE) 100 mg tablet Take 1 Tablet by mouth two (2) times a day. 12/9/22  Yes Provider, Historical   traZODone (DESYREL) 50 mg tablet Take 2 Tablets by mouth nightly. 1/5/22  Yes Provider, Historical   citalopram (CELEXA) 40 mg tablet Take 1 Tablet by mouth daily. 12/8/22  Yes Provider, Historical   amLODIPine (NORVASC) 10 mg tablet Take 1 Tablet by mouth daily.  11/23/22  Yes Provider, Historical   albuterol (PROVENTIL HFA, VENTOLIN HFA, PROAIR HFA) 90 mcg/actuation inhaler Take 2 Puffs by inhalation every six (6) hours as needed for Wheezing. Yes Provider, Historical   gabapentin (NEURONTIN) 100 mg capsule Take 100 mg by mouth three (3) times daily. Patient not taking: Reported on 4/25/2023 12/29/22   Provider, Historical     Allergies   Allergen Reactions    Adhesive Tape-Silicones Rash     Please use cloth tape        Social History     Tobacco Use    Smoking status: Every Day     Packs/day: 0.50     Types: Cigarettes    Smokeless tobacco: Never   Substance Use Topics    Alcohol use: Not Currently      Family History   Problem Relation Age of Onset    Heart Disease Mother     Hypertension Mother     Cancer Father         colon        Subjective:      Fred Dowling is a 57 y.o. RHWM with history of chronic alcohol use, COPD, hypertension, kidney issues and nutrition issues who was referred here by Romana Huff NP for further evaluation of his tremors. Patient is here for follow up. Per patient condition started gradually about 1 year prior to consult. Intermittent in nature. However it has increased in frequency and seems to be elevated more constant. Mainly affecting both hands. Not prominent at rest but more with use of the hand and when holding onto things. He also notices some episodes of leg tremors. Denies any tightness or muscle spasm. Denies any sense of weakness. Tremors currently does not affect his routine activities of daily living. Patient also reports issues of wobbliness and gait instability. Denies any falls. Patient also having issues with weight loss. Patient admits to chronic alcohol use and has recently been sober since his hospitalization last July 2022. Laboratory work-up done while in the hospital last July 2022 revealed nonreactive HIV testing.   Unremarkable CPK, glomerular basement membrane antibody, hepatitis B, immunoelectrophoresis, antineutrophil cytoplasmic antibody, EMERALD, C3/C4 complement, hemoglobin A1c, PTH. Elevated free kappa and lambda light chains and kappa/lambda ratio    CT of the abdomen and pelvis done 7/25/2022 revealed no acute process. Moderate emphysema. Indeterminant left renal mass (22 x 25 mm). Chest CT without contrast revealed mild emphysema. Renal ultrasound revealed no hydronephrosis. Bilateral echogenic kidneys consistent with medical renal disease. Since last visit on 12/19/2022, patient underwent head CT without contrast 12/29/2022 which showed mild bifrontal and bitemporal volume loss. Left vertebral artery calcification. Chronic lacunar infarct right external capsule and anterior limb of the left internal capsule. No acute process. Patient was seen by neurology nurse practitioner for follow-up last 2/1/2023. Discussed results of his head CT. Not a factor with his current issue. Note mentions patient complaining of some memory loss. Discussed physical therapy for balance and coordination but patient declined. Note mentions prior cocaine and alcohol use 10 years prior which could have led to the infarct. Since the last visit, patient reports tremors are the same. They are not any worse. Had an episode of a fall with no injury. He did see his PCP recently and was referred to physical therapy for balance and gait improvement. Reports ongoing issues with long-term memory problems more than a year and short-term memory problems getting worse for the past 6 months. Had 2 instances when he went into the wrong car. Gabapentin 600 mg 3 times daily helps with his neuropathic discomforts. Patient mentions he has been approved by his job for long-term disability and was then laid off. Awaiting Social Security disability.     Outside reports reviewed: radiology reports    Review of Systems:    A comprehensive review of systems was performed:   Constitutional: positive for weight loss, poor appetite, fatigue  Eyes: positive for vision problems  Ears, nose, mouth, throat, and face: positive for hearing problems  Respiratory: positive for none  Cardiovascular: positive for high blood pressure  Gastrointestinal: positive for none  Genitourinary: positive for none  Integument/breast: positive for none  Hematologic/lymphatic: positive for none  Musculoskeletal: positive for weakness  Neurological: positive for memory loss, tremor  Behavioral/Psych: positive for anxiety, depression  Endocrine: positive for none  Allergic/Immunologic: positive for none    Objective:     Visit Vitals  /78   Pulse 88   Temp 97.3 °F (36.3 °C) (Oral)   Resp 18   Ht 5' 10\" (1.778 m)   Wt 61.9 kg (136 lb 6.4 oz)   SpO2 98%   BMI 19.57 kg/m²       PHYSICAL EXAM:    Extremity: No joint swelling, erythema or pedal edema. R>L hammer toeing     NEUROLOGICAL EXAM:    Appearance: The patient is well developed, well nourished, provides a coherent history and is in no acute distress. Mental Status: Oriented to time, place and person. Fluent, no aphasia or dysarthria. Mood and affect appropriate. MMSE 26/30 (problem with exact date, spelling world backwards and copying a design)   Cranial Nerves:   Intact visual fields. JENISE, EOM's full, no nystagmus, no ptosis. Facial sensation is normal. Corneal reflexes are intact. Facial movement is symmetric. Hearing is normal bilaterally. Palate is midline with normal elevation. Sternocleidomastoid and trapezius muscles are normal. Tongue is midline. Motor:  5/5 strength except feet intrinsic muscle weakness and atrophy. Normal tone. No fasciculations. No pronator drift. Reflexes:   Deep tendon reflexes 1+/4 UE, trace knees and 0 ankle. Equivocal toes. Sensory:   Stocking sensory deficit (distal lower leg to feet) except PP. Gait:  Wide based on unsteady. (+) Romberg. Significant truncal instability. Problems with tandem walking. Tremor:   Intentional>postural UE tremor noted.   No resting pill rolling tremor Cerebellar:  Intact FTN/TAB/HTS. 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 Problems with exact date, spelling world backwards and copying a design. No obvious findings on exam of progressive issues with dementia. However given chronic alcohol use, head CT showing frontotemporal atrophy, to be prudent to assess for emerging issues with dementia. Formal neuropsychological testing was ordered. Further intervention be done pending results of testing. Neurological examination is unchanged. It reveals mild left greater than right upper extremity intentional greater than postural tremors. No resting pill tremor seen. No findings typically seen in Parkinson's disease. Head CT was ordered to further assess. At most issues consistent with benign essential tremors or tremor secondary to cerebellar issues due to alcoholism. Discussed with the patient findings and implication. Since tremors currently not affecting his routine activities of daily living then there is no indication to treat. However if condition worsens then potential trial of medications for tremors including primidone, beta-blockers, gabapentin, topiramate or as needed benzodiazepines. Will monitor for now. Encouraged to continue to abstain from any form of alcohol. Patient is showing signs and examination of cerebellar ataxia likely due to preferential shrinkage from chronic alcohol use. Head CT revealed atrophy frontal and temporal lobe as well are cerebellar structures. Again advised to continue to abstain from any use of alcohol. 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     Exam reveals distal lower extremity sensorimotor polyneuropathy in this case secondary to chronic alcohol use.  It is causing significant issues with gait stability and risk for falling. This issue will cause problems for patient in doing labor that preferentially includes prolonged walking or standing. May do well with sedentary labor of office work or just sitting down. Advised previously to discuss with primary care physician in relation to potential filing for disability in relation to his ongoing medical issues as well as presence of his polyneuropathy. He understood. Continue gabapentin for his neuropathic discomforts. Patient referred to hematology/oncology given abnormal kappa light chain findings. All questions and concerns were answered. This note was created using voice recognition software. Despite editing, there may be syntax errors.

## 2023-05-17 RX ORDER — ALBUTEROL SULFATE 90 UG/1
2 AEROSOL, METERED RESPIRATORY (INHALATION) EVERY 6 HOURS PRN
COMMUNITY

## 2023-05-17 RX ORDER — GABAPENTIN 600 MG/1
600 TABLET ORAL 3 TIMES DAILY
COMMUNITY

## 2023-05-17 RX ORDER — TRAZODONE HYDROCHLORIDE 50 MG/1
2 TABLET ORAL NIGHTLY
COMMUNITY
Start: 2022-01-05

## 2023-05-17 RX ORDER — CITALOPRAM 40 MG/1
40 TABLET ORAL DAILY
COMMUNITY
Start: 2022-12-08

## 2023-05-17 RX ORDER — LABETALOL 100 MG/1
100 TABLET, FILM COATED ORAL 2 TIMES DAILY
COMMUNITY
Start: 2022-12-09

## 2023-05-17 RX ORDER — AMLODIPINE BESYLATE 10 MG/1
10 TABLET ORAL DAILY
COMMUNITY
Start: 2022-11-23

## 2023-11-02 ENCOUNTER — OFFICE VISIT (OUTPATIENT)
Age: 63
End: 2023-11-02
Payer: COMMERCIAL

## 2023-11-02 VITALS
BODY MASS INDEX: 21.05 KG/M2 | RESPIRATION RATE: 16 BRPM | TEMPERATURE: 98.6 F | HEIGHT: 70 IN | SYSTOLIC BLOOD PRESSURE: 126 MMHG | HEART RATE: 60 BPM | WEIGHT: 147 LBS | DIASTOLIC BLOOD PRESSURE: 66 MMHG | OXYGEN SATURATION: 100 %

## 2023-11-02 DIAGNOSIS — R76.8 ELEVATED SERUM IMMUNOGLOBULIN FREE LIGHT CHAINS: Primary | ICD-10-CM

## 2023-11-02 DIAGNOSIS — N18.9 CHRONIC KIDNEY DISEASE, UNSPECIFIED CKD STAGE: ICD-10-CM

## 2023-11-02 DIAGNOSIS — I10 PRIMARY HYPERTENSION: ICD-10-CM

## 2023-11-02 DIAGNOSIS — F10.11 HISTORY OF ALCOHOL ABUSE: ICD-10-CM

## 2023-11-02 DIAGNOSIS — Z71.6 TOBACCO ABUSE COUNSELING: ICD-10-CM

## 2023-11-02 DIAGNOSIS — R76.8 ELEVATED SERUM IMMUNOGLOBULIN FREE LIGHT CHAINS: ICD-10-CM

## 2023-11-02 PROCEDURE — 1123F ACP DISCUSS/DSCN MKR DOCD: CPT | Performed by: INTERNAL MEDICINE

## 2023-11-02 PROCEDURE — 99204 OFFICE O/P NEW MOD 45 MIN: CPT | Performed by: INTERNAL MEDICINE

## 2023-11-02 PROCEDURE — 3074F SYST BP LT 130 MM HG: CPT | Performed by: INTERNAL MEDICINE

## 2023-11-02 PROCEDURE — 3078F DIAST BP <80 MM HG: CPT | Performed by: INTERNAL MEDICINE

## 2023-11-02 ASSESSMENT — PATIENT HEALTH QUESTIONNAIRE - PHQ9
SUM OF ALL RESPONSES TO PHQ QUESTIONS 1-9: 0
1. LITTLE INTEREST OR PLEASURE IN DOING THINGS: 0
SUM OF ALL RESPONSES TO PHQ QUESTIONS 1-9: 0
SUM OF ALL RESPONSES TO PHQ9 QUESTIONS 1 & 2: 0
SUM OF ALL RESPONSES TO PHQ QUESTIONS 1-9: 0
2. FEELING DOWN, DEPRESSED OR HOPELESS: 0
SUM OF ALL RESPONSES TO PHQ QUESTIONS 1-9: 0

## 2023-11-03 LAB
ALBUMIN SERPL-MCNC: 3.9 G/DL (ref 3.5–5)
ALBUMIN/GLOB SERPL: 1.4 (ref 1.1–2.2)
ALP SERPL-CCNC: 50 U/L (ref 45–117)
ALT SERPL-CCNC: 19 U/L (ref 12–78)
ANION GAP SERPL CALC-SCNC: 2 MMOL/L (ref 5–15)
AST SERPL-CCNC: 17 U/L (ref 15–37)
BASOPHILS # BLD: 0.1 K/UL (ref 0–0.1)
BASOPHILS NFR BLD: 1 % (ref 0–1)
BILIRUB SERPL-MCNC: 0.3 MG/DL (ref 0.2–1)
BUN SERPL-MCNC: 32 MG/DL (ref 6–20)
BUN/CREAT SERPL: 16 (ref 12–20)
CALCIUM SERPL-MCNC: 9 MG/DL (ref 8.5–10.1)
CHLORIDE SERPL-SCNC: 109 MMOL/L (ref 97–108)
CO2 SERPL-SCNC: 28 MMOL/L (ref 21–32)
CREAT SERPL-MCNC: 1.97 MG/DL (ref 0.7–1.3)
DIFFERENTIAL METHOD BLD: ABNORMAL
EOSINOPHIL # BLD: 0.2 K/UL (ref 0–0.4)
EOSINOPHIL NFR BLD: 3 % (ref 0–7)
ERYTHROCYTE [DISTWIDTH] IN BLOOD BY AUTOMATED COUNT: 14 % (ref 11.5–14.5)
GLOBULIN SER CALC-MCNC: 2.7 G/DL (ref 2–4)
GLUCOSE SERPL-MCNC: 87 MG/DL (ref 65–100)
HCT VFR BLD AUTO: 36.6 % (ref 36.6–50.3)
HGB BLD-MCNC: 11.2 G/DL (ref 12.1–17)
IMM GRANULOCYTES # BLD AUTO: 0 K/UL (ref 0–0.04)
IMM GRANULOCYTES NFR BLD AUTO: 1 % (ref 0–0.5)
LYMPHOCYTES # BLD: 0.9 K/UL (ref 0.8–3.5)
LYMPHOCYTES NFR BLD: 13 % (ref 12–49)
MCH RBC QN AUTO: 28.7 PG (ref 26–34)
MCHC RBC AUTO-ENTMCNC: 30.6 G/DL (ref 30–36.5)
MCV RBC AUTO: 93.8 FL (ref 80–99)
MONOCYTES # BLD: 0.6 K/UL (ref 0–1)
MONOCYTES NFR BLD: 9 % (ref 5–13)
NEUTS SEG # BLD: 4.9 K/UL (ref 1.8–8)
NEUTS SEG NFR BLD: 73 % (ref 32–75)
NRBC # BLD: 0 K/UL (ref 0–0.01)
NRBC BLD-RTO: 0 PER 100 WBC
PLATELET # BLD AUTO: 175 K/UL (ref 150–400)
PMV BLD AUTO: 9.2 FL (ref 8.9–12.9)
POTASSIUM SERPL-SCNC: 4.9 MMOL/L (ref 3.5–5.1)
PROT SERPL-MCNC: 6.6 G/DL (ref 6.4–8.2)
RBC # BLD AUTO: 3.9 M/UL (ref 4.1–5.7)
SODIUM SERPL-SCNC: 139 MMOL/L (ref 136–145)
WBC # BLD AUTO: 6.6 K/UL (ref 4.1–11.1)

## 2023-11-07 DIAGNOSIS — R76.8 ELEVATED SERUM IMMUNOGLOBULIN FREE LIGHT CHAINS: Primary | ICD-10-CM

## 2023-11-07 LAB
ALBUMIN SERPL ELPH-MCNC: 3.8 G/DL (ref 2.9–4.4)
ALBUMIN/GLOB SERPL: 1.5 (ref 0.7–1.7)
ALPHA1 GLOB SERPL ELPH-MCNC: 0.3 G/DL (ref 0–0.4)
ALPHA2 GLOB SERPL ELPH-MCNC: 0.7 G/DL (ref 0.4–1)
B-GLOBULIN SERPL ELPH-MCNC: 0.9 G/DL (ref 0.7–1.3)
GAMMA GLOB SERPL ELPH-MCNC: 0.7 G/DL (ref 0.4–1.8)
GLOBULIN SER-MCNC: 2.6 G/DL (ref 2.2–3.9)
IGA SERPL-MCNC: 121 MG/DL (ref 61–437)
IGG SERPL-MCNC: 738 MG/DL (ref 603–1613)
IGM SERPL-MCNC: 42 MG/DL (ref 20–172)
INTERPRETATION SERPL IEP-IMP: ABNORMAL
KAPPA LC FREE SER-MCNC: 70.2 MG/L (ref 3.3–19.4)
KAPPA LC FREE/LAMBDA FREE SER: 2.02 (ref 0.26–1.65)
LAMBDA LC FREE SERPL-MCNC: 34.7 MG/L (ref 5.7–26.3)
M PROTEIN SERPL ELPH-MCNC: ABNORMAL G/DL
PROT SERPL-MCNC: 6.4 G/DL (ref 6–8.5)

## 2024-02-08 ENCOUNTER — OFFICE VISIT (OUTPATIENT)
Age: 64
End: 2024-02-08
Payer: COMMERCIAL

## 2024-02-08 VITALS
SYSTOLIC BLOOD PRESSURE: 119 MMHG | TEMPERATURE: 97.9 F | WEIGHT: 141 LBS | BODY MASS INDEX: 20.19 KG/M2 | HEART RATE: 74 BPM | HEIGHT: 70 IN | OXYGEN SATURATION: 97 % | DIASTOLIC BLOOD PRESSURE: 67 MMHG | RESPIRATION RATE: 18 BRPM

## 2024-02-08 DIAGNOSIS — Z71.6 TOBACCO ABUSE COUNSELING: ICD-10-CM

## 2024-02-08 DIAGNOSIS — I10 PRIMARY HYPERTENSION: ICD-10-CM

## 2024-02-08 DIAGNOSIS — N18.9 CHRONIC KIDNEY DISEASE, UNSPECIFIED CKD STAGE: ICD-10-CM

## 2024-02-08 DIAGNOSIS — R76.8 ELEVATED SERUM IMMUNOGLOBULIN FREE LIGHT CHAINS: ICD-10-CM

## 2024-02-08 DIAGNOSIS — R76.8 ELEVATED SERUM IMMUNOGLOBULIN FREE LIGHT CHAINS: Primary | ICD-10-CM

## 2024-02-08 PROCEDURE — 99214 OFFICE O/P EST MOD 30 MIN: CPT | Performed by: INTERNAL MEDICINE

## 2024-02-08 PROCEDURE — 3074F SYST BP LT 130 MM HG: CPT | Performed by: INTERNAL MEDICINE

## 2024-02-08 PROCEDURE — 3078F DIAST BP <80 MM HG: CPT | Performed by: INTERNAL MEDICINE

## 2024-02-08 PROCEDURE — 1123F ACP DISCUSS/DSCN MKR DOCD: CPT | Performed by: INTERNAL MEDICINE

## 2024-02-08 ASSESSMENT — PATIENT HEALTH QUESTIONNAIRE - PHQ9
SUM OF ALL RESPONSES TO PHQ9 QUESTIONS 1 & 2: 0
1. LITTLE INTEREST OR PLEASURE IN DOING THINGS: 0
SUM OF ALL RESPONSES TO PHQ QUESTIONS 1-9: 0
2. FEELING DOWN, DEPRESSED OR HOPELESS: 0

## 2024-02-08 NOTE — PROGRESS NOTES
Chief Complaint   Patient presents with    Follow-up           Vitals:    02/08/24 1059   BP: 119/67   Pulse: 74   Resp: 18   Temp: 97.9 °F (36.6 °C)   SpO2: 97%            1. Have you been to the ER, urgent care clinic since your last visit?  Hospitalized since your last visit?  Yes JW Jan 2024 COPD Flare up  2. Have you seen or consulted any other health care providers outside of the [facility] since your last visit?  Include any pap smears or colon screening. Yes Nephrologist      
   Heart Disease Mother     Hypertension Mother      Current Outpatient Medications   Medication Sig    albuterol sulfate HFA (PROVENTIL;VENTOLIN;PROAIR) 108 (90 Base) MCG/ACT inhaler Inhale 2 puffs into the lungs every 6 hours as needed    amLODIPine (NORVASC) 10 MG tablet Take 1 tablet by mouth daily    citalopram (CELEXA) 40 MG tablet Take 1 tablet by mouth daily    gabapentin (NEURONTIN) 600 MG tablet Take 1 tablet by mouth 3 times daily.    labetalol (NORMODYNE) 100 MG tablet Take 1 tablet by mouth 2 times daily    traZODone (DESYREL) 50 MG tablet Take 2 tablets by mouth nightly     No current facility-administered medications for this visit.      Allergies   Allergen Reactions    Adhesive Tape Rash     Please use cloth tape        Review of Systems: A complete review of systems was obtained, negative except as described above.    Physical Exam:   Blood pressure 119/67, pulse 74, temperature 97.9 °F (36.6 °C), temperature source Temporal, resp. rate 18, height 1.778 m (5' 10\"), weight 64 kg (141 lb), SpO2 97 %.    ECOG PS: 1  General: no distress  Eyes: anicteric sclerae  HENT: oropharynx clear  Neck: supple  Lymphatic: no cervical, supraclavicular adenopathy  Respiratory: normal respiratory effort  CV: no peripheral edema  GI: soft, nontender, nondistended, no masses  Skin: no rashes; no ecchymoses; no petechiae      Results:     Lab Results   Component Value Date/Time    WBC 6.6 11/02/2023 11:48 AM    HGB 11.2 11/02/2023 11:48 AM    HCT 36.6 11/02/2023 11:48 AM     11/02/2023 11:48 AM    MCV 93.8 11/02/2023 11:48 AM    NEUTROABS 4.9 11/02/2023 11:48 AM     Lab Results   Component Value Date/Time     11/02/2023 11:48 AM    K 4.9 11/02/2023 11:48 AM     11/02/2023 11:48 AM    CO2 28 11/02/2023 11:48 AM    GLUCOSE 87 11/02/2023 11:48 AM    BUN 32 11/02/2023 11:48 AM    CREATININE 1.97 11/02/2023 11:48 AM    CALCIUM 9.0 11/02/2023 11:48 AM    PHOS 4.7 07/29/2022 12:40 AM     Lab Results

## 2024-02-09 LAB
ALBUMIN SERPL-MCNC: 4.5 G/DL (ref 3.9–4.9)
ALBUMIN/GLOB SERPL: 2.1 {RATIO} (ref 1.2–2.2)
ALP SERPL-CCNC: 50 IU/L (ref 44–121)
ALT SERPL-CCNC: 8 IU/L (ref 0–44)
AST SERPL-CCNC: 15 IU/L (ref 0–40)
BASOPHILS # BLD AUTO: 0.1 X10E3/UL (ref 0–0.2)
BASOPHILS NFR BLD AUTO: 1 %
BILIRUB SERPL-MCNC: 0.4 MG/DL (ref 0–1.2)
BUN SERPL-MCNC: 19 MG/DL (ref 8–27)
BUN/CREAT SERPL: 11 (ref 10–24)
CALCIUM SERPL-MCNC: 9.3 MG/DL (ref 8.6–10.2)
CHLORIDE SERPL-SCNC: 103 MMOL/L (ref 96–106)
CO2 SERPL-SCNC: 22 MMOL/L (ref 20–29)
CREAT SERPL-MCNC: 1.75 MG/DL (ref 0.76–1.27)
EGFRCR SERPLBLD CKD-EPI 2021: 43 ML/MIN/1.73
EOSINOPHIL # BLD AUTO: 0.1 X10E3/UL (ref 0–0.4)
EOSINOPHIL NFR BLD AUTO: 2 %
ERYTHROCYTE [DISTWIDTH] IN BLOOD BY AUTOMATED COUNT: 14.2 % (ref 11.6–15.4)
GLOBULIN SER CALC-MCNC: 2.1 G/DL (ref 1.5–4.5)
GLUCOSE SERPL-MCNC: 98 MG/DL (ref 70–99)
HCT VFR BLD AUTO: 38 % (ref 37.5–51)
HGB BLD-MCNC: 12.5 G/DL (ref 13–17.7)
IMM GRANULOCYTES # BLD AUTO: 0 X10E3/UL (ref 0–0.1)
IMM GRANULOCYTES NFR BLD AUTO: 0 %
LYMPHOCYTES # BLD AUTO: 0.9 X10E3/UL (ref 0.7–3.1)
LYMPHOCYTES NFR BLD AUTO: 13 %
MCH RBC QN AUTO: 28.3 PG (ref 26.6–33)
MCHC RBC AUTO-ENTMCNC: 32.9 G/DL (ref 31.5–35.7)
MCV RBC AUTO: 86 FL (ref 79–97)
MONOCYTES # BLD AUTO: 0.5 X10E3/UL (ref 0.1–0.9)
MONOCYTES NFR BLD AUTO: 8 %
NEUTROPHILS # BLD AUTO: 5.2 X10E3/UL (ref 1.4–7)
NEUTROPHILS NFR BLD AUTO: 76 %
PLATELET # BLD AUTO: 195 X10E3/UL (ref 150–450)
POTASSIUM SERPL-SCNC: 4.8 MMOL/L (ref 3.5–5.2)
RBC # BLD AUTO: 4.41 X10E6/UL (ref 4.14–5.8)
SODIUM SERPL-SCNC: 139 MMOL/L (ref 134–144)
WBC # BLD AUTO: 6.7 X10E3/UL (ref 3.4–10.8)

## 2024-02-15 LAB
ALBUMIN SERPL ELPH-MCNC: 4.1 G/DL (ref 2.9–4.4)
ALBUMIN/GLOB SERPL: 1.7 {RATIO} (ref 0.7–1.7)
ALPHA1 GLOB SERPL ELPH-MCNC: 0.3 G/DL (ref 0–0.4)
ALPHA2 GLOB SERPL ELPH-MCNC: 0.7 G/DL (ref 0.4–1)
B-GLOBULIN SERPL ELPH-MCNC: 0.8 G/DL (ref 0.7–1.3)
GAMMA GLOB SERPL ELPH-MCNC: 0.7 G/DL (ref 0.4–1.8)
GLOBULIN SER-MCNC: 2.5 G/DL (ref 2.2–3.9)
IGA SERPL-MCNC: 116 MG/DL (ref 61–437)
IGG SERPL-MCNC: 749 MG/DL (ref 603–1613)
IGM SERPL-MCNC: 47 MG/DL (ref 20–172)
INTERPRETATION SERPL IEP-IMP: ABNORMAL
KAPPA LC FREE SER-MCNC: 52.6 MG/L (ref 3.3–19.4)
KAPPA LC FREE/LAMBDA FREE SER: 1.8 {RATIO} (ref 0.26–1.65)
LABORATORY COMMENT REPORT: ABNORMAL
LAMBDA LC FREE SERPL-MCNC: 29.2 MG/L (ref 5.7–26.3)
M PROTEIN SERPL ELPH-MCNC: ABNORMAL G/DL
PROT SERPL-MCNC: 6.6 G/DL (ref 6–8.5)

## 2024-08-13 ENCOUNTER — TELEPHONE (OUTPATIENT)
Age: 64
End: 2024-08-13

## 2024-08-20 ENCOUNTER — TELEPHONE (OUTPATIENT)
Age: 64
End: 2024-08-20

## 2024-08-20 NOTE — TELEPHONE ENCOUNTER
Called patient about his missed apt and his wife stated that they do not want to reschedule at this time

## 2025-01-17 ENCOUNTER — APPOINTMENT (OUTPATIENT)
Facility: HOSPITAL | Age: 65
End: 2025-01-17
Payer: COMMERCIAL

## 2025-01-17 ENCOUNTER — HOSPITAL ENCOUNTER (EMERGENCY)
Facility: HOSPITAL | Age: 65
Discharge: HOME | End: 2025-01-17
Attending: EMERGENCY MEDICINE
Payer: COMMERCIAL

## 2025-01-17 VITALS
HEIGHT: 70 IN | WEIGHT: 132 LBS | TEMPERATURE: 98.4 F | HEART RATE: 85 BPM | DIASTOLIC BLOOD PRESSURE: 68 MMHG | BODY MASS INDEX: 18.9 KG/M2 | RESPIRATION RATE: 18 BRPM | OXYGEN SATURATION: 98 % | SYSTOLIC BLOOD PRESSURE: 119 MMHG

## 2025-01-17 DIAGNOSIS — K62.5 RECTAL BLEEDING: Primary | ICD-10-CM

## 2025-01-17 DIAGNOSIS — R63.4 WEIGHT LOSS: ICD-10-CM

## 2025-01-17 LAB
ABO + RH BLD: NORMAL
ALBUMIN SERPL-MCNC: 3.6 G/DL (ref 3.5–5)
ALBUMIN/GLOB SERPL: 1.2 (ref 1.1–2.2)
ALP SERPL-CCNC: 31 U/L (ref 45–117)
ALT SERPL-CCNC: 29 U/L (ref 12–78)
ANION GAP SERPL CALC-SCNC: 10 MMOL/L (ref 2–12)
AST SERPL-CCNC: 59 U/L (ref 15–37)
BASOPHILS # BLD: 0.06 K/UL (ref 0–0.1)
BASOPHILS NFR BLD: 0.9 % (ref 0–1)
BILIRUB SERPL-MCNC: 0.6 MG/DL (ref 0.2–1)
BLOOD GROUP ANTIBODIES SERPL: NORMAL
BUN SERPL-MCNC: 22 MG/DL (ref 6–20)
BUN/CREAT SERPL: 12 (ref 12–20)
CALCIUM SERPL-MCNC: 9.2 MG/DL (ref 8.5–10.1)
CHLORIDE SERPL-SCNC: 100 MMOL/L (ref 97–108)
CO2 SERPL-SCNC: 25 MMOL/L (ref 21–32)
CREAT SERPL-MCNC: 1.78 MG/DL (ref 0.7–1.3)
DIFFERENTIAL METHOD BLD: ABNORMAL
EOSINOPHIL # BLD: 0.06 K/UL (ref 0–0.4)
EOSINOPHIL NFR BLD: 0.9 % (ref 0–7)
ERYTHROCYTE [DISTWIDTH] IN BLOOD BY AUTOMATED COUNT: 14.6 % (ref 11.5–14.5)
GLOBULIN SER CALC-MCNC: 2.9 G/DL (ref 2–4)
GLUCOSE SERPL-MCNC: 158 MG/DL (ref 65–100)
HCT VFR BLD AUTO: 39.3 % (ref 36.6–50.3)
HGB BLD-MCNC: 13.4 G/DL (ref 12.1–17)
IMM GRANULOCYTES # BLD AUTO: 0.03 K/UL (ref 0–0.04)
IMM GRANULOCYTES NFR BLD AUTO: 0.4 % (ref 0–0.5)
INR PPP: 1 (ref 0.9–1.1)
LYMPHOCYTES # BLD: 0.85 K/UL (ref 0.8–3.5)
LYMPHOCYTES NFR BLD: 12.2 % (ref 12–49)
MCH RBC QN AUTO: 30.9 PG (ref 26–34)
MCHC RBC AUTO-ENTMCNC: 34.1 G/DL (ref 30–36.5)
MCV RBC AUTO: 90.8 FL (ref 80–99)
MONOCYTES # BLD: 0.53 K/UL (ref 0–1)
MONOCYTES NFR BLD: 7.6 % (ref 5–13)
NEUTS SEG # BLD: 5.46 K/UL (ref 1.8–8)
NEUTS SEG NFR BLD: 78 % (ref 32–75)
NRBC # BLD: 0 K/UL (ref 0–0.01)
NRBC BLD-RTO: 0 PER 100 WBC
PLATELET # BLD AUTO: 173 K/UL (ref 150–400)
PMV BLD AUTO: 9 FL (ref 8.9–12.9)
POTASSIUM SERPL-SCNC: 3.2 MMOL/L (ref 3.5–5.1)
PROT SERPL-MCNC: 6.5 G/DL (ref 6.4–8.2)
PROTHROMBIN TIME: 10.2 SEC (ref 9.2–11.2)
RBC # BLD AUTO: 4.33 M/UL (ref 4.1–5.7)
SODIUM SERPL-SCNC: 135 MMOL/L (ref 136–145)
SPECIMEN EXP DATE BLD: NORMAL
WBC # BLD AUTO: 7 K/UL (ref 4.1–11.1)

## 2025-01-17 PROCEDURE — 36415 COLL VENOUS BLD VENIPUNCTURE: CPT

## 2025-01-17 PROCEDURE — 74177 CT ABD & PELVIS W/CONTRAST: CPT

## 2025-01-17 PROCEDURE — 99285 EMERGENCY DEPT VISIT HI MDM: CPT

## 2025-01-17 PROCEDURE — 86900 BLOOD TYPING SEROLOGIC ABO: CPT

## 2025-01-17 PROCEDURE — 6360000004 HC RX CONTRAST MEDICATION: Performed by: EMERGENCY MEDICINE

## 2025-01-17 PROCEDURE — 71046 X-RAY EXAM CHEST 2 VIEWS: CPT

## 2025-01-17 PROCEDURE — 80053 COMPREHEN METABOLIC PANEL: CPT

## 2025-01-17 PROCEDURE — 85025 COMPLETE CBC W/AUTO DIFF WBC: CPT

## 2025-01-17 PROCEDURE — 86850 RBC ANTIBODY SCREEN: CPT

## 2025-01-17 PROCEDURE — 85610 PROTHROMBIN TIME: CPT

## 2025-01-17 PROCEDURE — 86901 BLOOD TYPING SEROLOGIC RH(D): CPT

## 2025-01-17 RX ORDER — IOPAMIDOL 755 MG/ML
100 INJECTION, SOLUTION INTRAVASCULAR
Status: COMPLETED | OUTPATIENT
Start: 2025-01-17 | End: 2025-01-17

## 2025-01-17 RX ADMIN — IOPAMIDOL 100 ML: 755 INJECTION, SOLUTION INTRAVENOUS at 14:29

## 2025-01-17 ASSESSMENT — PAIN SCALES - GENERAL: PAINLEVEL_OUTOF10: 0

## 2025-01-17 ASSESSMENT — PAIN - FUNCTIONAL ASSESSMENT: PAIN_FUNCTIONAL_ASSESSMENT: 0-10

## 2025-01-17 NOTE — ED TRIAGE NOTES
Pt arrives to the ED with complaints of rectal bleeding and weight loss, pt reports he went to [facility] in [place] where they told him he has a mass in his colon and wanted to transfer him to [place] however [facility]'s declined to to capacity. Pt signed out and came to [place] for further care.

## 2025-01-17 NOTE — ED PROVIDER NOTES
Normal rate and regular rhythm.   Pulmonary:      Effort: Pulmonary effort is normal.      Breath sounds: Normal breath sounds.   Neurological:      Mental Status: He is alert.         DIAGNOSTIC RESULTS     EKG: All EKG's are interpreted by the Emergency Department Physician who either signs or Co-signs this chart in the absence of a cardiologist.        RADIOLOGY:   Non-plain film images such as CT, Ultrasound and MRI are read by the radiologist. Plain radiographic images are visualized and preliminarily interpreted by the emergency physician with the below findings:        Interpretation per the Radiologist below, if available at the time of this note:    XR CHEST (2 VW)   Final Result   No acute cardiopulmonary abnormalities.      Electronically signed by WILBER Serra      CT ABDOMEN PELVIS W IV CONTRAST Additional Contrast? None   Final Result   No acute intraperitoneal process is identified.       Incidental and/or nonemergent findings are as described above.             Electronically signed by JI SCHMIDT           LABS:  Labs Reviewed   CBC WITH AUTO DIFFERENTIAL - Abnormal; Notable for the following components:       Result Value    RDW 14.6 (*)     Neutrophils % 78.0 (*)     All other components within normal limits   COMPREHENSIVE METABOLIC PANEL - Abnormal; Notable for the following components:    Sodium 135 (*)     Potassium 3.2 (*)     Glucose 158 (*)     BUN 22 (*)     Creatinine 1.78 (*)     Est, Glom Filt Rate 42 (*)     AST 59 (*)     Alk Phosphatase 31 (*)     All other components within normal limits   PROTIME-INR   TYPE AND SCREEN       All other labs were within normal range or not returned as of this dictation.    EMERGENCY DEPARTMENT COURSE and DIFFERENTIAL DIAGNOSIS/MDM:   Vitals:    Vitals:    01/17/25 1308   BP: 119/68   Pulse: 85   Resp: 18   Temp: 98.4 °F (36.9 °C)   TempSrc: Oral   SpO2: 98%   Weight: 59.9 kg (132 lb)   Height: 1.778 m (5' 10\")           Medical Decision Making  Stable

## (undated) DEVICE — PADDING CAST 3 INX4 YD STRL

## (undated) DEVICE — SKIN MARKER,REGULAR TIP WITH RULER AND LABELS: Brand: DEVON

## (undated) DEVICE — NDL FLTR TIP 5 MIC 18GX1.5IN --

## (undated) DEVICE — NDL SPNE QNCKE 18GX3.5IN LF --

## (undated) DEVICE — SYR 5ML 1/5 GRAD LL NSAF LF --

## (undated) DEVICE — BNDG ADHESIVE SHEER 3/4X3 -- CONVERT TO ITEM 357948

## (undated) DEVICE — DISPOSABLE TOURNIQUET CUFF SINGLE BLADDER, DUAL PORT AND QUICK CONNECT CONNECTOR: Brand: COLOR CUFF

## (undated) DEVICE — SURGICAL PROCEDURE PACK BASIN MAJ SET CUST NO CAUT

## (undated) DEVICE — STERILE POLYISOPRENE POWDER-FREE SURGICAL GLOVES: Brand: PROTEXIS

## (undated) DEVICE — TOWEL SURG W17XL27IN STD BLU COT NONFENESTRATED PREWASHED

## (undated) DEVICE — ROCKER SWITCH PENCIL BLADE ELECTRODE, HOLSTER: Brand: EDGE

## (undated) DEVICE — DEVON™ KNEE AND BODY STRAP 60" X 3" (1.5 M X 7.6 CM): Brand: DEVON

## (undated) DEVICE — TOWEL,OR,DSP,ST,BLUE,STD,2/PK,40PK/CS: Brand: MEDLINE

## (undated) DEVICE — SET EXTN TBNG L BOR 4 W STPCOCK ST 32IN PRIMING VOL 6ML

## (undated) DEVICE — (D)SYR 10ML 1/5ML GRAD NSAF -- PKGING CHANGE USE ITEM 338027

## (undated) DEVICE — DRAPE,EXTREMITY,89X128,STERILE: Brand: MEDLINE

## (undated) DEVICE — SUTURE VCRL SZ 3-0 L27IN ABSRB UD L19MM PS-2 3/8 CIR PRIM J427H

## (undated) DEVICE — SOL INJ SOD CL0.9% 10ML PREFIL --

## (undated) DEVICE — INTENDED FOR TISSUE SEPARATION, AND OTHER PROCEDURES THAT REQUIRE A SHARP SURGICAL BLADE TO PUNCTURE OR CUT.: Brand: BARD-PARKER ® CARBON RIB-BACK BLADES

## (undated) DEVICE — (D)GLOVE SURG ORTH 8.5 PWD LTX -- DISC BY MFR USE ITEM 278016

## (undated) DEVICE — (D)PREP SKN CHLRAPRP APPL 26ML -- CONVERT TO ITEM 371833

## (undated) DEVICE — DEVICE TRNSF SPIK STL 2008S] MICROTEK MEDICAL INC]

## (undated) DEVICE — 3000CC GUARDIAN II: Brand: GUARDIAN

## (undated) DEVICE — DRSG GZ OIL EMUL CURAD 3X3 --

## (undated) DEVICE — STOCKINETTE: Brand: DEROYAL

## (undated) DEVICE — LIGHT HANDLE: Brand: DEVON

## (undated) DEVICE — DRAPE,REIN 53X77,STERILE: Brand: MEDLINE

## (undated) DEVICE — GAUZE SPONGES,12 PLY: Brand: CURITY

## (undated) DEVICE — KIT INFECTION CTRL ST FRAN --

## (undated) DEVICE — NEEDLE HYPO 18GA L1.5IN PNK S STL HUB POLYPR SHLD REG BVL

## (undated) DEVICE — NEEDLE HYPO 25GA L1.5IN BVL ORIENTED ECLIPSE

## (undated) DEVICE — STRETCH BANDAGE ROLL: Brand: DERMACEA

## (undated) DEVICE — BASIC PACK: Brand: CONVERTORS

## (undated) DEVICE — SUTURE ETHLN SZ 4-0 L18IN NONABSORBABLE BLK L19MM PS-2 3/8 1667H

## (undated) DEVICE — SOL IRRIGATION INJ NACL 0.9% 500ML BTL

## (undated) DEVICE — Z CONVERTED USE 2271043 CONTAINER SPEC COLL 4OZ SCR ON LID PEEL PCH